# Patient Record
Sex: FEMALE | HISPANIC OR LATINO | Employment: OTHER | URBAN - METROPOLITAN AREA
[De-identification: names, ages, dates, MRNs, and addresses within clinical notes are randomized per-mention and may not be internally consistent; named-entity substitution may affect disease eponyms.]

---

## 2021-04-28 ENCOUNTER — TELEPHONE (OUTPATIENT)
Dept: GASTROENTEROLOGY | Facility: CLINIC | Age: 66
End: 2021-04-28

## 2022-01-12 ENCOUNTER — OFFICE VISIT (OUTPATIENT)
Dept: INTERNAL MEDICINE | Facility: CLINIC | Age: 67
End: 2022-01-12

## 2022-01-12 VITALS
HEIGHT: 60 IN | RESPIRATION RATE: 16 BRPM | OXYGEN SATURATION: 97 % | SYSTOLIC BLOOD PRESSURE: 122 MMHG | HEART RATE: 68 BPM | WEIGHT: 96 LBS | TEMPERATURE: 97.7 F | DIASTOLIC BLOOD PRESSURE: 70 MMHG | BODY MASS INDEX: 18.85 KG/M2

## 2022-01-12 DIAGNOSIS — E78.2 MODERATE MIXED HYPERLIPIDEMIA NOT REQUIRING STATIN THERAPY: ICD-10-CM

## 2022-01-12 DIAGNOSIS — Z00.00 MEDICARE ANNUAL WELLNESS VISIT, SUBSEQUENT: Primary | ICD-10-CM

## 2022-01-12 DIAGNOSIS — M81.0 OSTEOPOROSIS WITHOUT CURRENT PATHOLOGICAL FRACTURE, UNSPECIFIED OSTEOPOROSIS TYPE: ICD-10-CM

## 2022-01-12 DIAGNOSIS — F41.9 ANXIETY: ICD-10-CM

## 2022-01-12 DIAGNOSIS — G47.09 OTHER INSOMNIA: ICD-10-CM

## 2022-01-12 DIAGNOSIS — Z79.899 HIGH RISK MEDICATION USE: ICD-10-CM

## 2022-01-12 DIAGNOSIS — Z78.0 POSTMENOPAUSAL: ICD-10-CM

## 2022-01-12 PROBLEM — K52.9 COLITIS: Status: ACTIVE | Noted: 2022-01-12

## 2022-01-12 PROBLEM — G47.00 INSOMNIA: Status: ACTIVE | Noted: 2019-12-05

## 2022-01-12 PROBLEM — R19.7 DIARRHEA: Status: ACTIVE | Noted: 2021-08-25

## 2022-01-12 PROCEDURE — 99203 OFFICE O/P NEW LOW 30 MIN: CPT | Performed by: NURSE PRACTITIONER

## 2022-01-12 PROCEDURE — 1159F MED LIST DOCD IN RCRD: CPT | Performed by: NURSE PRACTITIONER

## 2022-01-12 PROCEDURE — 1126F AMNT PAIN NOTED NONE PRSNT: CPT | Performed by: NURSE PRACTITIONER

## 2022-01-12 PROCEDURE — 1170F FXNL STATUS ASSESSED: CPT | Performed by: NURSE PRACTITIONER

## 2022-01-12 PROCEDURE — G0439 PPPS, SUBSEQ VISIT: HCPCS | Performed by: NURSE PRACTITIONER

## 2022-01-12 RX ORDER — BUSPIRONE HYDROCHLORIDE 5 MG/1
TABLET ORAL
COMMUNITY
Start: 2020-12-30 | End: 2022-01-12 | Stop reason: SDUPTHER

## 2022-01-12 RX ORDER — BUDESONIDE 3 MG/1
9 CAPSULE, COATED PELLETS ORAL DAILY
COMMUNITY
Start: 2021-05-14 | End: 2022-10-27

## 2022-01-12 RX ORDER — ZOLPIDEM TARTRATE 5 MG/1
TABLET ORAL
COMMUNITY
Start: 2018-10-18 | End: 2022-08-31 | Stop reason: SDUPTHER

## 2022-01-12 RX ORDER — DIPHENOXYLATE HYDROCHLORIDE AND ATROPINE SULFATE 2.5; .025 MG/1; MG/1
1 TABLET ORAL DAILY
COMMUNITY

## 2022-01-12 RX ORDER — BUSPIRONE HYDROCHLORIDE 5 MG/1
5 TABLET ORAL 3 TIMES DAILY
Qty: 90 TABLET | Refills: 1 | Status: SHIPPED | OUTPATIENT
Start: 2022-01-12 | End: 2022-04-04

## 2022-01-12 NOTE — PROGRESS NOTES
The ABCs of the Annual Wellness Visit  Subsequent Medicare Wellness Visit    Chief Complaint   Patient presents with   • Medicare Wellness-subsequent   • Anxiety   • Establish Care      Subjective    History of Present Illness:  Terri albright is a 66 y.o. female who presents for a Subsequent Medicare Wellness Visit and to establish care. She is a previous patient of Dr Lucy suarez at [facility] and would like to transfer care to [facility]. She has anxiety and insomnia. She takes buspar as needed and ambien as needed. She states her anxiety has worsened and wants to discuss treatment. She has Collagenous colitis and is followed by Gastro at [facility]. I have reviewed her records in care everywhere       The following portions of the patient's history were reviewed and   updated as appropriate: allergies, current medications, past family history, past medical history, past social history, past surgical history and problem list.    Compared to one year ago, the patient feels her physical   health is the same.    Compared to one year ago, the patient feels her mental   health is the same.    Recent Hospitalizations:  She was not admitted to the hospital during the last year.       Current Medical Providers:  Patient Care Team:  Kitty Schmitt APRN as PCP - General (Family Medicine)    Outpatient Medications Prior to Visit   Medication Sig Dispense Refill   • Ascorbic Acid (VITAMIN C PO) Take  by mouth.     • Budesonide (ENTOCORT EC) 3 MG 24 hr capsule Take 9 mg by mouth Daily.     • Glucos-Chond-Hyal Ac-Ca Fructo (MOVE FREE JOINT HEALTH ADVANCE PO) Take  by mouth.     • Multiple Vitamins-Minerals (ALGAE BASED CALCIUM PO) Take  by mouth.     • multivitamin (multivitamin) tablet tablet Take 1 tablet by mouth Daily.     • Omega-3 Fatty Acids (FISH OIL PO) Take  by mouth.     • Probiotic Product (PROBIOTIC PO) Take  by mouth.     • TURMERIC PO Take  by mouth.     • zolpidem (AMBIEN) 5 MG tablet TAKE 0.5-1 TABLETS  "BY MOUTH NIGHTLY AS NEEDED FOR SLEEP. MUST LAST 30 DAYS. MAX DAILY AMOUNT: 5 MG     • busPIRone (BUSPAR) 5 MG tablet        No facility-administered medications prior to visit.       No opioid medication identified on active medication list. I have reviewed chart for other potential  high risk medication/s and harmful drug interactions in the elderly.          Aspirin is not on active medication list.  Aspirin use is not indicated based on review of current medical condition/s. Risk of harm outweighs potential benefits.  .    Patient Active Problem List   Diagnosis   • Colitis   • Anxiety   • Diarrhea   • Insomnia   • Osteoporosis     Advance Care Planning  Advance Directive is not on file.  ACP discussion was held with the patient during this visit. Patient has an advance directive (not in EMR), copy requested.    Review of Systems   Constitutional: Negative for fatigue.   HENT: Negative.    Respiratory: Negative.    Gastrointestinal: Negative for abdominal pain and diarrhea.        History of collagenous colitis    Endocrine: Negative.    Genitourinary: Negative.    Musculoskeletal: Negative.    Neurological: Negative.    Psychiatric/Behavioral: Positive for sleep disturbance. Negative for dysphoric mood. The patient is nervous/anxious.         Objective    Vitals:    01/12/22 0815   BP: 122/70   Pulse: 68   Resp: 16   Temp: 97.7 °F (36.5 °C)   TempSrc: Temporal   SpO2: 97%   Weight: 43.5 kg (96 lb)   Height: 152.4 cm (60\")   PainSc: 0-No pain     BMI Readings from Last 1 Encounters:   01/12/22 18.75 kg/m²   BMI is within normal parameters. No follow-up required.    Does the patient have evidence of cognitive impairment? No significant, occasionally forgetful     Physical Exam  Vitals and nursing note reviewed.   Constitutional:       General: She is not in acute distress.     Appearance: Normal appearance. She is well-developed and well-groomed.   HENT:      Head: Normocephalic.   Cardiovascular:      Rate and " Rhythm: Normal rate and regular rhythm.   Pulmonary:      Effort: Pulmonary effort is normal.      Breath sounds: Normal breath sounds.   Skin:     General: Skin is warm and dry.   Neurological:      Mental Status: She is alert and oriented to person, place, and time.   Psychiatric:         Mood and Affect: Mood normal.                 HEALTH RISK ASSESSMENT    Smoking Status:  Social History     Tobacco Use   Smoking Status Never Smoker   Smokeless Tobacco Not on file     Alcohol Consumption:  Social History     Substance and Sexual Activity   Alcohol Use Yes   • Alcohol/week: 4.0 standard drinks   • Types: 4 Glasses of wine per week    Comment: with dinner     Fall Risk Screen:    STEADI Fall Risk Assessment was completed, and patient is at LOW risk for falls.Assessment completed on:1/12/2022    Depression Screening:  PHQ-2/PHQ-9 Depression Screening 1/12/2022   Little interest or pleasure in doing things 0   Feeling down, depressed, or hopeless 0   Total Score 0       Health Habits and Functional and Cognitive Screening:  Functional & Cognitive Status 1/12/2022   Do you have difficulty preparing food and eating? No   Do you have difficulty bathing yourself, getting dressed or grooming yourself? No   Do you have difficulty using the toilet? No   Do you have difficulty moving around from place to place? No   Do you have trouble with steps or getting out of a bed or a chair? No   Current Diet Unhealthy Diet   Dental Exam Up to date   Eye Exam Up to date   Exercise (times per week) 7 times per week   Current Exercises Include Walking   Do you need help using the phone?  No   Are you deaf or do you have serious difficulty hearing?  No   Do you need help with transportation? No   Do you need help shopping? No   Do you need help preparing meals?  No   Do you need help with housework?  No   Do you need help with laundry? No   Do you need help taking your medications? No   Do you need help managing money? No   Do you ever  drive or ride in a car without wearing a seat belt? No   Have you felt unusual stress, anger or loneliness in the last month? No   Who do you live with? Spouse   If you need help, do you have trouble finding someone available to you? No   Have you been bothered in the last four weeks by sexual problems? No   Do you have difficulty concentrating, remembering or making decisions? No       Age-appropriate Screening Schedule:  Refer to the list below for future screening recommendations based on patient's age, sex and/or medical conditions. Orders for these recommended tests are listed in the plan section. The patient has been provided with a written plan.    Health Maintenance   Topic Date Due   • DXA SCAN  12/05/2020   • LIPID PANEL  01/12/2022   • TDAP/TD VACCINES (1 - Tdap) 01/12/2022 (Originally 8/6/1974)   • MAMMOGRAM  04/07/2023   • INFLUENZA VACCINE  Completed   • ZOSTER VACCINE  Completed              Assessment/Plan   CMS Preventative Services Quick Reference  Risk Factors Identified During Encounter  Immunizations Discussed/Encouraged (specific Immunizations; Tdap- recommend booster at pharmacy   The above risks/problems have been discussed with the patient.  Follow up actions/plans if indicated are seen below in the Assessment/Plan Section.  Pertinent information has been shared with the patient in the After Visit Summary.    Diagnoses and all orders for this visit:    1. Medicare annual wellness visit, subsequent (Primary)    2. Moderate mixed hyperlipidemia not requiring statin therapy  -     Lipid Panel With LDL / HDL Ratio  -     Comprehensive Metabolic Panel  -     Urinalysis With Microscopic - Urine, Clean Catch  -     TSH Rfx On Abnormal To Free T4  -     CBC & Differential    3. Other insomnia  -     Urine Drug Screen - Urine, Clean Catch    4. Anxiety    5. Osteoporosis without current pathological fracture, unspecified osteoporosis type  -     DEXA Bone Density Axial    6. Postmenopausal  -      DEXA Bone Density Axial    7. High risk medication use  -     Urine Drug Screen - Urine, Clean Catch    Other orders  -     busPIRone (BUSPAR) 5 MG tablet; Take 1 tablet by mouth 3 (Three) Times a Day.  Dispense: 90 tablet; Refill: 1      Will have her return for fasting labs  She does not currently need a refill on ambien.  She takes this as needed. Alex was reviewed and is appropriate. Will obtain UDS when she comes in for fasting labs. The patient has read and signed the [facility] Controlled Substance Contract.  I will continue to see patient for regular follow up appointments.  They are well controlled on their medication.  ALEX is updated every 3 months. The patient is aware of the potential for addiction and dependence.  For anxiety recommend that she take buspar regularly instead of prn. She can start with 5mg daily and increase to three times daily as tolerated   Follow Up:   Return in about 1 week (around 1/19/2022) for fasting labs only (orders in) .     An After Visit Summary and PPPS were made available to the patient.

## 2022-02-26 LAB
ALBUMIN SERPL-MCNC: 4.2 G/DL (ref 3.8–4.8)
ALBUMIN/GLOB SERPL: 1.3 {RATIO} (ref 1.2–2.2)
ALP SERPL-CCNC: 46 IU/L (ref 44–121)
ALT SERPL-CCNC: 16 IU/L (ref 0–32)
AMPHETAMINES UR QL SCN: NEGATIVE NG/ML
APPEARANCE UR: ABNORMAL
AST SERPL-CCNC: 15 IU/L (ref 0–40)
BACTERIA #/AREA URNS HPF: NORMAL /[HPF]
BARBITURATES UR QL SCN: NEGATIVE NG/ML
BASOPHILS # BLD AUTO: 0 X10E3/UL (ref 0–0.2)
BASOPHILS NFR BLD AUTO: 1 %
BENZODIAZ UR QL SCN: NEGATIVE NG/ML
BILIRUB SERPL-MCNC: 0.3 MG/DL (ref 0–1.2)
BILIRUB UR QL STRIP: NEGATIVE
BUN SERPL-MCNC: 15 MG/DL (ref 8–27)
BUN/CREAT SERPL: 14 (ref 12–28)
BZE UR QL SCN: NEGATIVE NG/ML
CALCIUM SERPL-MCNC: 9.5 MG/DL (ref 8.7–10.3)
CANNABINOIDS UR QL SCN: NEGATIVE NG/ML
CASTS URNS QL MICRO: NORMAL /LPF
CHLORIDE SERPL-SCNC: 107 MMOL/L (ref 96–106)
CHOLEST SERPL-MCNC: 220 MG/DL (ref 100–199)
CO2 SERPL-SCNC: 23 MMOL/L (ref 20–29)
COLOR UR: YELLOW
CREAT SERPL-MCNC: 1.09 MG/DL (ref 0.57–1)
CREAT UR-MCNC: 117.8 MG/DL (ref 20–300)
EOSINOPHIL # BLD AUTO: 0.1 X10E3/UL (ref 0–0.4)
EOSINOPHIL NFR BLD AUTO: 2 %
EPI CELLS #/AREA URNS HPF: NORMAL /HPF (ref 0–10)
ERYTHROCYTE [DISTWIDTH] IN BLOOD BY AUTOMATED COUNT: 13.7 % (ref 11.7–15.4)
GLOBULIN SER CALC-MCNC: 3.2 G/DL (ref 1.5–4.5)
GLUCOSE SERPL-MCNC: 85 MG/DL (ref 65–99)
GLUCOSE UR QL STRIP: NEGATIVE
HCT VFR BLD AUTO: 35.1 % (ref 34–46.6)
HDLC SERPL-MCNC: 90 MG/DL
HGB BLD-MCNC: 11.7 G/DL (ref 11.1–15.9)
HGB UR QL STRIP: NEGATIVE
IMM GRANULOCYTES # BLD AUTO: 0 X10E3/UL (ref 0–0.1)
IMM GRANULOCYTES NFR BLD AUTO: 0 %
KETONES UR QL STRIP: NEGATIVE
LABORATORY COMMENT REPORT: NORMAL
LDLC SERPL CALC-MCNC: 111 MG/DL (ref 0–99)
LDLC/HDLC SERPL: 1.2 RATIO (ref 0–3.2)
LEUKOCYTE ESTERASE UR QL STRIP: NEGATIVE
LYMPHOCYTES # BLD AUTO: 1.9 X10E3/UL (ref 0.7–3.1)
LYMPHOCYTES NFR BLD AUTO: 33 %
MCH RBC QN AUTO: 29.9 PG (ref 26.6–33)
MCHC RBC AUTO-ENTMCNC: 33.3 G/DL (ref 31.5–35.7)
MCV RBC AUTO: 90 FL (ref 79–97)
METHADONE UR QL SCN: NEGATIVE NG/ML
MICRO URNS: ABNORMAL
MICRO URNS: ABNORMAL
MONOCYTES # BLD AUTO: 0.4 X10E3/UL (ref 0.1–0.9)
MONOCYTES NFR BLD AUTO: 6 %
NEUTROPHILS # BLD AUTO: 3.3 X10E3/UL (ref 1.4–7)
NEUTROPHILS NFR BLD AUTO: 58 %
NITRITE UR QL STRIP: NEGATIVE
OPIATES UR QL SCN: NEGATIVE NG/ML
OXYCODONE+OXYMORPHONE UR QL SCN: NEGATIVE NG/ML
PCP UR QL: NEGATIVE NG/ML
PH UR STRIP: 7 [PH] (ref 5–7.5)
PH UR: 7.5 [PH] (ref 4.5–8.9)
PLATELET # BLD AUTO: 262 X10E3/UL (ref 150–450)
POTASSIUM SERPL-SCNC: 3.7 MMOL/L (ref 3.5–5.2)
PROPOXYPH UR QL SCN: NEGATIVE NG/ML
PROT SERPL-MCNC: 7.4 G/DL (ref 6–8.5)
PROT UR QL STRIP: NEGATIVE
RBC # BLD AUTO: 3.91 X10E6/UL (ref 3.77–5.28)
RBC #/AREA URNS HPF: NORMAL /HPF (ref 0–2)
SODIUM SERPL-SCNC: 143 MMOL/L (ref 134–144)
SP GR UR STRIP: 1.02 (ref 1–1.03)
TRIGL SERPL-MCNC: 108 MG/DL (ref 0–149)
TSH SERPL DL<=0.005 MIU/L-ACNC: 2.29 UIU/ML (ref 0.45–4.5)
UROBILINOGEN UR STRIP-MCNC: 0.2 MG/DL (ref 0.2–1)
VLDLC SERPL CALC-MCNC: 19 MG/DL (ref 5–40)
WBC # BLD AUTO: 5.7 X10E3/UL (ref 3.4–10.8)
WBC #/AREA URNS HPF: NORMAL /HPF (ref 0–5)

## 2022-04-04 RX ORDER — BUSPIRONE HYDROCHLORIDE 5 MG/1
TABLET ORAL
Qty: 90 TABLET | Refills: 1 | Status: SHIPPED | OUTPATIENT
Start: 2022-04-04 | End: 2022-08-31

## 2022-05-13 ENCOUNTER — TELEMEDICINE (OUTPATIENT)
Dept: INTERNAL MEDICINE | Facility: CLINIC | Age: 67
End: 2022-05-13

## 2022-05-13 ENCOUNTER — TELEPHONE (OUTPATIENT)
Dept: INTERNAL MEDICINE | Facility: CLINIC | Age: 67
End: 2022-05-13

## 2022-05-13 DIAGNOSIS — U07.1 COVID-19: Primary | ICD-10-CM

## 2022-05-13 PROCEDURE — 99213 OFFICE O/P EST LOW 20 MIN: CPT | Performed by: NURSE PRACTITIONER

## 2022-05-13 RX ORDER — BUDESONIDE 3 MG/1
3 CAPSULE, COATED PELLETS ORAL DAILY
COMMUNITY
Start: 2022-04-07 | End: 2023-03-02

## 2022-05-13 NOTE — TELEPHONE ENCOUNTER
Caller: Terri Nicole    Relationship to patient: Self    Best call back number: [phone] (H)    Date of exposure:  05/11/22    Date of positive COVID19 test: 05/13/22    Date if possible COVID19 exposure: 05/11/22    COVID19 symptoms:  CHILLS, SWEATY, SORE THROAT AND CONGESTION.    Date of initial quarantine: 05/13/22    Additional information or concerns:  PATIENT CALLED TO ADVISE THAT SHE HAS TESTED POSITIVE FOR COVID THIS MORNING. PATIENT IS WANTING TO KNOW HOW TO MAKE THE SYMPTOMS BETTER.     What is the patients preferred pharmacy: [id number] [address] AT [facility] [phone] [facility] [phone] FX            THANKS

## 2022-05-13 NOTE — PROGRESS NOTES
Subjective   Terri albright is a 66 y.o. female.   Chief Complaint   Patient presents with   • URI     Tested positive for covid 5/13/22        There were no vitals filed for this visit.  No LMP recorded. Patient is postmenopausal.    History of Present Illness  Terri is a 66 year old female patient who is being seen via telemedicine for an acute visit. Both the patient and provider are located in [place], [place]. She c/o sore throat, headache, and congestion that started on 5/12/22. She took a home covid test that was positive. She denies fever, chest pain, and shortness of breath. She is full vaccinated with a recent booster     The following portions of the patient's history were reviewed and updated as appropriate: allergies, current medications, past family history, past medical history, past social history, past surgical history and problem list.    Review of Systems   Constitutional: Positive for fatigue. Negative for fever.   HENT: Positive for congestion, ear pain, sinus pressure, sinus pain and sore throat.    Respiratory: Negative for chest tightness and shortness of breath.    Cardiovascular: Negative.    Neurological: Positive for headaches.       Objective   Physical Exam  Constitutional:       General: She is not in acute distress.  Neurological:      Mental Status: She is alert.         Assessment & Plan   Diagnoses and all orders for this visit:    1. COVID-19 (Primary)  -     Molnupiravir (LAGEVRIO) 200 MG capsule; Take 4 capsules by mouth Every 12 (Twelve) Hours for 5 days.  Dispense: 40 capsule; Refill: 0      rx for molupiravir sent to pharmacy - pt is aware that is is authorized for Emergency use by the FDA for tx of covid 19  Discussed potential side effects  I recommend attention to rest and fluids. I recommend as needed tylenol for fevers and aches. I recommend adding OTC vitamin D, C and zinc. I recommend monitoring pulse oximetry if you have access to that. You should quarantine  for ten days.  It can be five days if you are feeling better and fever free but you should continue to wear a mask for a total of ten days around others. Lagevrio is FDA approved for emergency use.  It is less effective than Paxlovid but has fewer drug interactions.  It should not be used in men and women of child bearing age.    Reasons to go to the ER include severe trouble breathing, chest pain, confusion, inability to wake or stay awake, and bluish lips or face.  Continue supportive measures and let me know if there is any change in symptoms.

## 2022-05-13 NOTE — PATIENT INSTRUCTIONS
I recommend attention to rest and fluids. I recommend as needed tylenol for fevers and aches. I recommend adding OTC vitamin D, C and zinc. I recommend monitoring pulse oximetry if you have access to that. You should quarantine for ten days.  It can be five days if you are feeling better and fever free but you should continue to wear a mask for a total of ten days around others. Lagevrio is FDA approved for emergency use.  It is less effective than Paxlovid but has fewer drug interactions.  It should not be used in men and women of child bearing age.    Reasons to go to the ER include severe trouble breathing, chest pain, confusion, inability to wake or stay awake, and bluish lips or face.  Continue supportive measures and let me know if there is any change in symptoms.

## 2022-07-18 ENCOUNTER — HOSPITAL ENCOUNTER (OUTPATIENT)
Dept: BONE DENSITY | Facility: HOSPITAL | Age: 67
Discharge: HOME | End: 2022-07-18
Admitting: NURSE PRACTITIONER

## 2022-07-18 PROCEDURE — 77080 DXA BONE DENSITY AXIAL: CPT

## 2022-08-31 ENCOUNTER — OFFICE VISIT (OUTPATIENT)
Dept: INTERNAL MEDICINE | Facility: CLINIC | Age: 67
End: 2022-08-31

## 2022-08-31 VITALS
HEART RATE: 85 BPM | WEIGHT: 94 LBS | SYSTOLIC BLOOD PRESSURE: 110 MMHG | OXYGEN SATURATION: 98 % | HEIGHT: 60 IN | RESPIRATION RATE: 16 BRPM | BODY MASS INDEX: 18.46 KG/M2 | DIASTOLIC BLOOD PRESSURE: 56 MMHG

## 2022-08-31 DIAGNOSIS — R10.31 GROIN PAIN, CHRONIC, RIGHT: ICD-10-CM

## 2022-08-31 DIAGNOSIS — F51.01 PRIMARY INSOMNIA: Primary | ICD-10-CM

## 2022-08-31 DIAGNOSIS — M81.0 OSTEOPOROSIS WITHOUT CURRENT PATHOLOGICAL FRACTURE, UNSPECIFIED OSTEOPOROSIS TYPE: ICD-10-CM

## 2022-08-31 DIAGNOSIS — G89.29 GROIN PAIN, CHRONIC, RIGHT: ICD-10-CM

## 2022-08-31 DIAGNOSIS — F41.9 ANXIETY: ICD-10-CM

## 2022-08-31 PROCEDURE — 99214 OFFICE O/P EST MOD 30 MIN: CPT | Performed by: NURSE PRACTITIONER

## 2022-08-31 RX ORDER — ZOLPIDEM TARTRATE 5 MG/1
5 TABLET ORAL NIGHTLY PRN
Qty: 30 TABLET | Refills: 3 | Status: SHIPPED | OUTPATIENT
Start: 2022-08-31 | End: 2026-07-14

## 2022-08-31 RX ORDER — BUSPIRONE HYDROCHLORIDE 5 MG/1
5 TABLET ORAL 3 TIMES DAILY
Qty: 90 TABLET | Refills: 3 | Status: SHIPPED | OUTPATIENT
Start: 2022-08-31 | End: 2022-09-12

## 2022-08-31 NOTE — PROGRESS NOTES
Subjective   Terri albright is a 67 y.o. female. Patient is here today for   Chief Complaint   Patient presents with   • Insomnia   • Groin Pain     Only with certain movements           Vitals:    08/31/22 0945   BP: 110/56   Pulse: 85   Resp: 16   SpO2: 98%     Body mass index is 18.36 kg/m².  The following portions of the patient's history were reviewed and updated as appropriate: allergies, current medications, past family history, past medical history, past social history, past surgical history and problem list.    Past Medical History:   Diagnosis Date   • Anxiety    • Cataract    • Glaucoma    • Inflammatory bowel disease    • Osteopenia       No Known Allergies   Social History     Socioeconomic History   • Marital status:    Tobacco Use   • Smoking status: Never Smoker   Substance and Sexual Activity   • Alcohol use: Yes     Alcohol/week: 4.0 standard drinks     Types: 4 Glasses of wine per week     Comment: with dinner   • Drug use: Never   • Sexual activity: Yes     Partners: Male     Birth control/protection: Post-menopausal        Current Outpatient Medications:   •  Ascorbic Acid (VITAMIN C PO), Take  by mouth., Disp: , Rfl:   •  Budesonide (ENTOCORT EC) 3 MG 24 hr capsule, Take 9 mg by mouth Daily., Disp: , Rfl:   •  Budesonide (ENTOCORT EC) 3 MG 24 hr capsule, Take 3 mg by mouth Daily., Disp: , Rfl:   •  busPIRone (BUSPAR) 5 MG tablet, Take 1 tablet by mouth 3 (Three) Times a Day., Disp: 90 tablet, Rfl: 3  •  Glucos-Chond-Hyal Ac-Ca Fructo (MOVE FREE JOINT HEALTH ADVANCE PO), Take  by mouth., Disp: , Rfl:   •  Multiple Vitamins-Minerals (ALGAE BASED CALCIUM PO), Take  by mouth., Disp: , Rfl:   •  multivitamin (THERAGRAN) tablet tablet, Take 1 tablet by mouth Daily., Disp: , Rfl:   •  Omega-3 Fatty Acids (FISH OIL PO), Take  by mouth., Disp: , Rfl:   •  Probiotic Product (PROBIOTIC PO), Take  by mouth., Disp: , Rfl:   •  TURMERIC PO, Take  by mouth., Disp: , Rfl:   •  zolpidem  (AMBIEN) 5 MG tablet, Take 1 tablet by mouth At Night As Needed for Sleep., Disp: 30 tablet, Rfl: 3     Objective     History of Present Illness  Terri is a 67 year old female patient who is here for medication refills. She has insomnia and takes ambien as needed. This was prescribed by her previous PCP. It works well for her and she is not experiencing any side effects. She also takes buspar for anxiety which works well for. Her she would like a refill today   She has had right groin that is only with certain movements. It started several months ago. She denies any known injury. She denies dysuria, urgency and frequency. She has intermittent diarrhea and constipation. She sees GI for colitis and is on a maintenance dose of budesonide  She has osteoporosis and was on fosamax many years ago. She states she did not tolerate it well. Last DEXA showed osteoporosis with some improvement in bone density of the right femoral neck      Review of Systems   Constitutional: Negative for fatigue and fever.   Respiratory: Negative.    Cardiovascular: Negative.    Gastrointestinal: Positive for constipation and diarrhea.   Genitourinary: Negative for dysuria, flank pain and frequency.   Musculoskeletal:        Right groin pain, no injury , only occurs with certain movements        Physical Exam  Vitals and nursing note reviewed.   Constitutional:       General: She is not in acute distress.     Appearance: Normal appearance. She is well-developed and well-groomed.   HENT:      Head: Normocephalic.   Cardiovascular:      Rate and Rhythm: Normal rate.   Pulmonary:      Effort: Pulmonary effort is normal.   Abdominal:      Hernia: There is no hernia in the left inguinal area or right inguinal area.       Neurological:      Mental Status: She is alert.         ASSESSMENT     Problems Addressed this Visit     Anxiety    Insomnia - Primary    Relevant Medications    zolpidem (AMBIEN) 5 MG tablet    Osteoporosis      Other Visit  Diagnoses     Groin pain, chronic, right        Relevant Orders    Ambulatory Referral to Physical Therapy Evaluate and treat      Diagnoses       Codes Comments    Primary insomnia    -  Primary ICD-10-CM: F51.01  ICD-9-CM: 307.42     Osteoporosis without current pathological fracture, unspecified osteoporosis type     ICD-10-CM: M81.0  ICD-9-CM: 733.00     Groin pain, chronic, right     ICD-10-CM: R10.31, G89.29  ICD-9-CM: 789.03, 338.29     Anxiety     ICD-10-CM: F41.9  ICD-9-CM: 300.00           PLAN  1. Insomnia- rx for ambien refmanuel Marquez reviewed and contract UTD  2. Anxiety- continue buspirone   3. Osteoporosis, discussed reclast and prolia, declines tx at this time, will continue exercise and calcium supplement. She will let me know if she changes her mind, will repeat DEXA in 2 years   4. Right groin pain with certain movements- will refer to PT, if no improvement will get imaging     Return in about 6 months (around 2/28/2023) for medicare wellness. or sooner if needed

## 2022-09-12 RX ORDER — BUSPIRONE HYDROCHLORIDE 5 MG/1
TABLET ORAL
Qty: 90 TABLET | Refills: 3 | Status: SHIPPED | OUTPATIENT
Start: 2022-09-12 | End: 2022-12-14

## 2022-10-27 ENCOUNTER — OFFICE VISIT (OUTPATIENT)
Dept: INTERNAL MEDICINE | Facility: CLINIC | Age: 67
End: 2022-10-27

## 2022-10-27 VITALS
RESPIRATION RATE: 16 BRPM | OXYGEN SATURATION: 98 % | HEIGHT: 60 IN | SYSTOLIC BLOOD PRESSURE: 130 MMHG | HEART RATE: 88 BPM | DIASTOLIC BLOOD PRESSURE: 70 MMHG | BODY MASS INDEX: 18.85 KG/M2 | WEIGHT: 96 LBS

## 2022-10-27 DIAGNOSIS — M53.3 COCCYX PAIN: Primary | ICD-10-CM

## 2022-10-27 DIAGNOSIS — M25.561 ACUTE PAIN OF RIGHT KNEE: ICD-10-CM

## 2022-10-27 PROCEDURE — 99213 OFFICE O/P EST LOW 20 MIN: CPT | Performed by: NURSE PRACTITIONER

## 2022-10-27 PROCEDURE — 72220 X-RAY EXAM SACRUM TAILBONE: CPT | Performed by: NURSE PRACTITIONER

## 2022-10-27 NOTE — PROGRESS NOTES
Subjective   Terri albright is a 67 y.o. female. Patient is here today for   Chief Complaint   Patient presents with   • Tailbone Pain   • Knee Pain     When walking   Answers for HPI/ROS submitted by the patient on 10/26/2022  Please describe your symptoms.: My knee pain is getting worse.  Have you had these symptoms before?: Yes  How long have you been having these symptoms?: Greater than 2 weeks  Please list any medications you are currently taking for this condition.: none  Please describe any probable cause for these symptoms. : Arthritis., Also, I hit my tailbone, it hurts.  What is the primary reason for your visit?: Other           Vitals:    10/27/22 1314   BP: 130/70   Pulse: 88   Resp: 16   SpO2: 98%     Body mass index is 18.75 kg/m².  The following portions of the patient's history were reviewed and updated as appropriate: allergies, current medications, past family history, past medical history, past social history, past surgical history and problem list.    Past Medical History:   Diagnosis Date   • Anxiety    • Cataract    • Glaucoma    • Inflammatory bowel disease    • Osteopenia       No Known Allergies   Social History     Socioeconomic History   • Marital status:    Tobacco Use   • Smoking status: Never   Substance and Sexual Activity   • Alcohol use: Yes     Alcohol/week: 4.0 standard drinks     Types: 4 Glasses of wine per week     Comment: with dinner   • Drug use: Never   • Sexual activity: Yes     Partners: Male     Birth control/protection: Post-menopausal        Current Outpatient Medications:   •  Ascorbic Acid (VITAMIN C PO), Take  by mouth., Disp: , Rfl:   •  Budesonide (ENTOCORT EC) 3 MG 24 hr capsule, Take 3 mg by mouth Daily., Disp: , Rfl:   •  busPIRone (BUSPAR) 5 MG tablet, TAKE 1 TABLET BY MOUTH THREE TIMES DAILY, Disp: 90 tablet, Rfl: 3  •  Glucos-Chond-Hyal Ac-Ca Fructo (MOVE FREE JOINT HEALTH ADVANCE PO), Take  by mouth., Disp: , Rfl:   •  Multiple  Vitamins-Minerals (ALGAE BASED CALCIUM PO), Take  by mouth., Disp: , Rfl:   •  multivitamin (THERAGRAN) tablet tablet, Take 1 tablet by mouth Daily., Disp: , Rfl:   •  Omega-3 Fatty Acids (FISH OIL PO), Take  by mouth., Disp: , Rfl:   •  Probiotic Product (PROBIOTIC PO), Take  by mouth., Disp: , Rfl:   •  TURMERIC PO, Take  by mouth., Disp: , Rfl:   •  zolpidem (AMBIEN) 5 MG tablet, Take 1 tablet by mouth At Night As Needed for Sleep., Disp: 30 tablet, Rfl: 3     Objective     History of Present Illness  Terri is a 67 year old female patient who is here for an acute visit. She c/o right knee pain for a month . She denies any known injury. She is requesting a referral to ortho  She also c/o of tail bone pain for one month after falling back onto a hard bench.   Knee Pain   There was no injury mechanism. The quality of the pain is described as aching. The pain is mild. The pain has been intermittent since onset. Pertinent negatives include no inability to bear weight, muscle weakness or numbness. The symptoms are aggravated by palpation and movement. She has tried rest and ice for the symptoms. The treatment provided mild relief.        Review of Systems   Constitutional: Negative for fatigue.   Respiratory: Negative.    Cardiovascular: Negative.    Musculoskeletal:        Right knee pain   Coccyx pain    Neurological: Negative for numbness.       Physical Exam  Vitals and nursing note reviewed.   Constitutional:       General: She is not in acute distress.     Appearance: Normal appearance. She is well-developed and well-groomed.   Cardiovascular:      Rate and Rhythm: Normal rate.   Pulmonary:      Effort: Pulmonary effort is normal.   Musculoskeletal:        Legs:    Neurological:      Mental Status: She is alert.         ASSESSMENT     Problems Addressed this Visit    None  Visit Diagnoses     Coccyx pain    -  Primary    Relevant Orders    XR Sacrum & Coccyx (In Office)    Acute pain of right knee        Relevant  Orders    Ambulatory Referral to Orthopedic Surgery      Diagnoses       Codes Comments    Coccyx pain    -  Primary ICD-10-CM: M53.3  ICD-9-CM: 724.79     Acute pain of right knee     ICD-10-CM: M25.561  ICD-9-CM: 719.46           PLAN    Will get an xray of sacruma nd coccyx due to ongoing pain   Recommend tylenol or motrin as needed  Avoid sitting for long periods  Can sit on a pillow for support if needed  Pt requests referral to ortho for right knee pain - will hold off on getting xray in the office on knee as ortho will likely do one in office   Follow up as needed and for continual care

## 2022-11-02 ENCOUNTER — TELEPHONE (OUTPATIENT)
Dept: INTERNAL MEDICINE | Facility: CLINIC | Age: 67
End: 2022-11-02

## 2022-11-02 NOTE — TELEPHONE ENCOUNTER
----- Message from FRANK Hansen sent at 11/2/2022  3:08 PM EDT -----  Please call her and let her know that there is no abnormality or fracture of the sacrum or coccyx. She does have degenerative changes of the lumbar spine. Recommend that she follow up if pain persists

## 2022-11-08 ENCOUNTER — OFFICE VISIT (OUTPATIENT)
Dept: ORTHOPEDIC SURGERY | Facility: CLINIC | Age: 67
End: 2022-11-08

## 2022-11-08 VITALS — BODY MASS INDEX: 17.98 KG/M2 | HEIGHT: 60 IN | WEIGHT: 91.6 LBS | TEMPERATURE: 97.6 F

## 2022-11-08 DIAGNOSIS — G89.29 CHRONIC PAIN OF RIGHT KNEE: ICD-10-CM

## 2022-11-08 DIAGNOSIS — M17.11 PATELLOFEMORAL ARTHRITIS OF RIGHT KNEE: Primary | ICD-10-CM

## 2022-11-08 DIAGNOSIS — M25.561 CHRONIC PAIN OF RIGHT KNEE: ICD-10-CM

## 2022-11-08 PROCEDURE — 73562 X-RAY EXAM OF KNEE 3: CPT | Performed by: NURSE PRACTITIONER

## 2022-11-08 PROCEDURE — 99214 OFFICE O/P EST MOD 30 MIN: CPT | Performed by: NURSE PRACTITIONER

## 2022-11-08 NOTE — PROGRESS NOTES
[facility]  New Problem      Patient Name: Terri albright  : 1955  Primary Care Physician: Kitty Schmitt APRN        Chief Complaint: Right knee pain    HPI:   Terri albright is a 67 y.o. year old who presents today for evaluation of right knee pain.  Patient reports a history of chronic knee pain for a number of years it has been worsening recently.  She notices her symptoms seem to be worse when going downstairs, no nighttime pain.  She endorses anterior lateral knee pain with a little bit of radiation into the upper leg but no clear extension into the calf.  She endorses a little bit of groin pain at times with certain yoga poses.  Overall she is very active lady does stretches and enjoys walking.  Walking does not seem to bother her knees so much.  She takes minimal medications, some supplements for joint health.  She is here today with new x-rays of her right knee for further evaluation and treatment.      Past Medical/Surgical, Social and Family History:  I have reviewed and/or updated pertinent history as noted in the medical record including:  Past Medical History:   Diagnosis Date   • Anxiety    • Cataract    • Glaucoma    • Inflammatory bowel disease    • Knee swelling 10 years ago   • Osteopenia      Past Surgical History:   Procedure Laterality Date   •  SECTION     • COLONOSCOPY     • TONSILLECTOMY       Social History     Occupational History   • Not on file   Tobacco Use   • Smoking status: Never   • Smokeless tobacco: Not on file   Substance and Sexual Activity   • Alcohol use: Yes     Alcohol/week: 4.0 standard drinks     Types: 4 Glasses of wine per week     Comment: with dinner   • Drug use: Never   • Sexual activity: Yes     Partners: Male     Birth control/protection: Post-menopausal          Allergies: No Known Allergies    Medications:   Home Medications:  Current Outpatient Medications on File Prior to Visit   Medication Sig   • Ascorbic Acid  (VITAMIN C PO) Take  by mouth.   • Budesonide (ENTOCORT EC) 3 MG 24 hr capsule Take 3 mg by mouth Daily.   • busPIRone (BUSPAR) 5 MG tablet TAKE 1 TABLET BY MOUTH THREE TIMES DAILY   • Glucos-Chond-Hyal Ac-Ca Fructo (MOVE FREE JOINT HEALTH ADVANCE PO) Take  by mouth.   • Multiple Vitamins-Minerals (ALGAE BASED CALCIUM PO) Take  by mouth.   • multivitamin (THERAGRAN) tablet tablet Take 1 tablet by mouth Daily.   • Omega-3 Fatty Acids (FISH OIL PO) Take  by mouth.   • Probiotic Product (PROBIOTIC PO) Take  by mouth.   • TURMERIC PO Take  by mouth.   • zolpidem (AMBIEN) 5 MG tablet Take 1 tablet by mouth At Night As Needed for Sleep.     No current facility-administered medications on file prior to visit.         ROS:  14 point review of systems was negative except as listed in the HPI.    Physical Exam:   67 y.o. female  Body mass index is 17.89 kg/m²., 41.5 kg (91 lb 9.6 oz)  Vitals:    11/08/22 0848   Temp: 97.6 °F (36.4 °C)     General: Alert, cooperative, appears well and in no observable distress. Appears stated age and BMI as listed above.  HEENT: Normocephalic, atraumatic on external visual inspection.  CV: No significant peripheral edema.  Respiratory: Normal respiratory effort.  Skin: Warm & well perfused; appropriate skin turgor.  Psych: Appropriate mood & affect.  Neuro: Gross sensation and motor intact in affected extremity/extremities.  Vascular: Peripheral pulses palpable in affected extremity/extremities.     MSK Exam:  Right Knee  No deformity or wounds appreciated. No significant redness or warmth.  Trace effusion noted  Tenderness along the joint line appreciated :none  ROM 0-130 with pain at terminal motion.   Negative Bounce home  Negative Hans  Ligamentous exam grossly stable  Quad strength 4-4+/5    Left Knee  No deformity or wounds appreciated. No significant redness or warmth.  No significant effusion noted.  No significant tenderness appreciated about the joint.  ROM 0-130 and  painless.  Ligamentous exam grossly stable  Quad strength 4+ to 5/5    Brief hip exam in the affected extremity(ies) grossly unremarkable. No pain with passive internal or external rotation.  Moves ankle and toes up and down, no significant pain or swelling in the foot, ankle or calf.       Radiology:    The following X-rays were ordered/reviewed today to evaluate the patient's symptoms: Single Knee: AP standing and sunrise views of both knees, and lateral view of painful knee show Some degenerative changes primarily in the patellofemoral compartment.  Otherwise medial lateral joint spaces are well-preserved bilaterally.  She has some subtle evidence of meniscal chondrocalcinosis in the lateral aspect of the knee right greater than left. There are no prior films available for direct comparison.    Procedure:   N/A    Procedures    Misc. Data/Labs: N/A    Assessment & Plan:    ICD-10-CM ICD-9-CM   1. Patellofemoral arthritis of right knee  M17.11 716.96   2. Chronic pain of right knee  M25.561 719.46    G89.29 338.29     No orders of the defined types were placed in this encounter.    Orders Placed This Encounter   Procedures   • XR Knee 3 View Right         Is a 67-year-old female with right knee pain I suspect her symptoms are related to patellofemoral arthritis.  She does have some meniscal chondrocalcinosis noted on x-rays however I do not appreciate her symptoms seem to be related directly to that.  No locking or catching no mechanical symptoms otherwise.  We talked about conservative treatments she maintains a healthy weight, I we will give her some stretching and strengthening exercises, we could consider formal physical therapy.  We talked a little bit about injections as an option in the future.  We also talked about using Voltaren gel.  She does wear braces as needed intermittently I think that is also fine.  I will see her back in 4 weeks if needed, if her symptoms fail to improve we might consider the  injection or we could consider additional means of testing such as an MRI.    Return in about 4 weeks (around 12/6/2022), or if symptoms worsen or fail to improve.    Patient encouraged to call with questions or concerns prior to follow up.  Recommend ICE and/or HEAT PRN as discussed.  Will discuss with attending physician as needed.  Consider additional referrals, work up and/or advanced imaging as indicated or if patient fails to respond to conservative care.        Partha Sahu, APRN

## 2022-11-11 ENCOUNTER — PATIENT ROUNDING (BHMG ONLY) (OUTPATIENT)
Dept: ORTHOPEDIC SURGERY | Facility: CLINIC | Age: 67
End: 2022-11-11

## 2022-11-11 NOTE — PROGRESS NOTES
A Checkout10 Message has been sent to the patient for PATIENT ROUNDING with [facility]

## 2022-12-02 ENCOUNTER — TELEPHONE (OUTPATIENT)
Dept: INTERNAL MEDICINE | Facility: CLINIC | Age: 67
End: 2022-12-02

## 2022-12-02 DIAGNOSIS — M53.3 COCCYX PAIN: Primary | ICD-10-CM

## 2022-12-02 NOTE — TELEPHONE ENCOUNTER
----- Message from Nadiya Chong MA sent at 12/2/2022 12:24 PM EST -----  Regarding: FW: Physical therapy, referral  Contact: [phone]    ----- Message -----  From: Terri Nicole  Sent: 12/2/2022  12:23 PM EST  To: [facility]  Subject: Physical therapy, referral                       As you suggested, I asked the physical therapists if they could help with my tailbone. They said yes. Since it isn't getting better, I'd like to try it. Would you please send a referral to [facility] in [facility]?  Thanks,  Terri

## 2022-12-14 RX ORDER — BUSPIRONE HYDROCHLORIDE 5 MG/1
TABLET ORAL
Qty: 90 TABLET | Refills: 3 | Status: SHIPPED | OUTPATIENT
Start: 2022-12-14 | End: 2023-01-26 | Stop reason: SDUPTHER

## 2023-01-18 ENCOUNTER — TELEPHONE (OUTPATIENT)
Dept: INTERNAL MEDICINE | Facility: CLINIC | Age: 68
End: 2023-01-18
Payer: MEDICARE

## 2023-01-18 DIAGNOSIS — M53.3 COCCYX PAIN: Primary | ICD-10-CM

## 2023-01-18 NOTE — TELEPHONE ENCOUNTER
[facility] called and stated that patient is coming in tomorrow for coccyx pain. She said she needs an updated physical therapy order becuaee of medicare guidelines. The order in there is 30 days old and medicare needs updated order. Can you please put in physical therapy order.     [facility] fax number is below:  Fax # [phone]

## 2023-01-24 RX ORDER — BUSPIRONE HYDROCHLORIDE 5 MG/1
5 TABLET ORAL 3 TIMES DAILY
Qty: 90 TABLET | Refills: 2 | Status: CANCELLED | OUTPATIENT
Start: 2023-01-24

## 2023-01-26 RX ORDER — BUSPIRONE HYDROCHLORIDE 5 MG/1
5 TABLET ORAL 3 TIMES DAILY
Qty: 90 TABLET | Refills: 3 | Status: SHIPPED | OUTPATIENT
Start: 2023-01-26

## 2023-02-01 ENCOUNTER — TELEPHONE (OUTPATIENT)
Dept: INTERNAL MEDICINE | Facility: CLINIC | Age: 68
End: 2023-02-01
Payer: MEDICARE

## 2023-02-01 NOTE — TELEPHONE ENCOUNTER
Please call [facility] and I need diagnosis codes for all of these orders. She does not have it listed on her chart. Medicare will not cover vitamin d , thyroid, and magnesium , crp without a diagnosis

## 2023-02-01 NOTE — TELEPHONE ENCOUNTER
----- Message from May Liriano sent at 2/1/2023  9:20 AM EST -----  Regarding: FW: labs  Contact: [phone]  Lab appointment mid February  ----- Message -----  From: Terri Nicole  Sent: 2/1/2023   9:04 AM EST  To: [facility]  Subject: labs                                             I'm scheduled for labs later in the month. Would it be possible to add the following?    Vitamin D, Metabolic Panel, Magnesium, TSH with Free T4 and Free T3, B12, CBC, CRP HS    I have a family history of thyroid challenges, a full thyroid test would be helpful    Thanks!    Terri

## 2023-02-09 ENCOUNTER — TELEPHONE (OUTPATIENT)
Dept: INTERNAL MEDICINE | Facility: CLINIC | Age: 68
End: 2023-02-09
Payer: MEDICARE

## 2023-02-09 DIAGNOSIS — F41.9 ANXIETY: ICD-10-CM

## 2023-02-09 DIAGNOSIS — F51.01 PRIMARY INSOMNIA: Primary | ICD-10-CM

## 2023-02-09 DIAGNOSIS — Z79.899 HIGH RISK MEDICATION USE: ICD-10-CM

## 2023-02-09 DIAGNOSIS — E78.2 MODERATE MIXED HYPERLIPIDEMIA NOT REQUIRING STATIN THERAPY: ICD-10-CM

## 2023-02-09 DIAGNOSIS — R53.82 CHRONIC FATIGUE: ICD-10-CM

## 2023-02-09 DIAGNOSIS — M81.0 OSTEOPOROSIS WITHOUT CURRENT PATHOLOGICAL FRACTURE, UNSPECIFIED OSTEOPOROSIS TYPE: ICD-10-CM

## 2023-02-09 NOTE — TELEPHONE ENCOUNTER
----- Message from Dennies Garrick, MA sent at 2/9/2023  2:35 PM EST -----  Regarding: FW: labs  Contact: [phone]    ----- Message -----  From: Terri Nicole  Sent: 2/9/2023   2:12 PM EST  To: [facility]  Subject: labs                                             I'm scheduled for labs later in the month. Would it be possible to add the following?   Vitamin D, Metabolic Panel, Magnesium, TSH with Free T4 and Free T3, B12, CBC, CRP HS    I have a family history of thyroid challenges, a full thyroid test would be helpful.  Vitamin D: Fatigue (R53.83), Osteoporosis M81.0, Anxiety F41.9, Diarrhea R19.7  Metabolic Panel:  Fatigue R53.83, Osteoporosis M81.0,Diarrhea R19.7, Abdominal Pain R10.9  Magnesium:  Fatigue R53.83, Osteoporosis M81.0,Diarrhea R19.7, Abdominal Pain R10.9  TSH, Free T3, Free T4:  Fatigue R53.83, Osteoporosis M81.0,Diarrhea R19.7, Abdominal Pain R10.9  B12:  Fatigue R53.83, Osteoporosis M81.0,Diarrhea R19.7, Abdominal Pain R10.9, Anxiety f41.9  CBC:  Fatigue R53.83, Osteoporosis M81.0,Diarrhea R19.7, Abdominal Pain R10.9  CRP HS:  Fatigue R53.83, Osteoporosis M81.0,Diarrhea R19.7, Abdominal Pain R10.9

## 2023-02-24 LAB
25(OH)D3+25(OH)D2 SERPL-MCNC: 120 NG/ML (ref 30–100)
ALBUMIN SERPL-MCNC: 4.4 G/DL (ref 3.5–5.2)
ALBUMIN/GLOB SERPL: 1.2 G/DL
ALP SERPL-CCNC: 60 U/L (ref 39–117)
ALT SERPL-CCNC: 22 U/L (ref 1–33)
AMPHETAMINES UR QL SCN: NEGATIVE NG/ML
APPEARANCE UR: ABNORMAL
AST SERPL-CCNC: 14 U/L (ref 1–32)
BACTERIA #/AREA URNS HPF: NORMAL /HPF
BARBITURATES UR QL SCN: NEGATIVE NG/ML
BASOPHILS # BLD AUTO: 0.06 10*3/MM3 (ref 0–0.2)
BASOPHILS NFR BLD AUTO: 1.1 % (ref 0–1.5)
BENZODIAZ UR QL SCN: NEGATIVE NG/ML
BILIRUB SERPL-MCNC: 0.4 MG/DL (ref 0–1.2)
BILIRUB UR QL STRIP: NEGATIVE
BUN SERPL-MCNC: 9 MG/DL (ref 8–23)
BUN/CREAT SERPL: 8 (ref 7–25)
BZE UR QL SCN: NEGATIVE NG/ML
CALCIUM SERPL-MCNC: 9.4 MG/DL (ref 8.6–10.5)
CANNABINOIDS UR QL SCN: NEGATIVE NG/ML
CHLORIDE SERPL-SCNC: 105 MMOL/L (ref 98–107)
CHOLEST SERPL-MCNC: 205 MG/DL (ref 0–200)
CO2 SERPL-SCNC: 25.3 MMOL/L (ref 22–29)
COLOR UR: YELLOW
CREAT SERPL-MCNC: 1.13 MG/DL (ref 0.57–1)
CREAT UR-MCNC: 69.9 MG/DL (ref 20–300)
CRP SERPL-MCNC: <0.3 MG/DL (ref 0–0.5)
EGFRCR SERPLBLD CKD-EPI 2021: 52.8 ML/MIN/1.73
EOSINOPHIL # BLD AUTO: 0.36 10*3/MM3 (ref 0–0.4)
EOSINOPHIL NFR BLD AUTO: 6.5 % (ref 0.3–6.2)
EPI CELLS #/AREA URNS HPF: NORMAL /HPF
ERYTHROCYTE [DISTWIDTH] IN BLOOD BY AUTOMATED COUNT: 13.3 % (ref 12.3–15.4)
GLOBULIN SER CALC-MCNC: 3.7 GM/DL
GLUCOSE SERPL-MCNC: 78 MG/DL (ref 65–99)
GLUCOSE UR QL STRIP: NEGATIVE
HCT VFR BLD AUTO: 34.6 % (ref 34–46.6)
HDLC SERPL-MCNC: 82 MG/DL (ref 40–60)
HGB BLD-MCNC: 11.5 G/DL (ref 12–15.9)
HGB UR QL STRIP: NEGATIVE
IMM GRANULOCYTES # BLD AUTO: 0.01 10*3/MM3 (ref 0–0.05)
IMM GRANULOCYTES NFR BLD AUTO: 0.2 % (ref 0–0.5)
KETONES UR QL STRIP: NEGATIVE
LABORATORY COMMENT REPORT: NORMAL
LDLC SERPL CALC-MCNC: 107 MG/DL (ref 0–100)
LDLC/HDLC SERPL: 1.27 {RATIO}
LEUKOCYTE ESTERASE UR QL STRIP: NEGATIVE
LYMPHOCYTES # BLD AUTO: 2.31 10*3/MM3 (ref 0.7–3.1)
LYMPHOCYTES NFR BLD AUTO: 41.5 % (ref 19.6–45.3)
MAGNESIUM SERPL-MCNC: 2 MG/DL (ref 1.6–2.4)
MCH RBC QN AUTO: 30 PG (ref 26.6–33)
MCHC RBC AUTO-ENTMCNC: 33.2 G/DL (ref 31.5–35.7)
MCV RBC AUTO: 90.3 FL (ref 79–97)
METHADONE UR QL SCN: NEGATIVE NG/ML
MONOCYTES # BLD AUTO: 0.35 10*3/MM3 (ref 0.1–0.9)
MONOCYTES NFR BLD AUTO: 6.3 % (ref 5–12)
NEUTROPHILS # BLD AUTO: 2.47 10*3/MM3 (ref 1.7–7)
NEUTROPHILS NFR BLD AUTO: 44.4 % (ref 42.7–76)
NITRITE UR QL STRIP: NEGATIVE
NRBC BLD AUTO-RTO: 0 /100 WBC (ref 0–0.2)
OPIATES UR QL SCN: NEGATIVE NG/ML
OXYCODONE+OXYMORPHONE UR QL SCN: NEGATIVE NG/ML
PCP UR QL: NEGATIVE NG/ML
PH UR STRIP: 7.5 [PH] (ref 5–8)
PH UR: 8.7 [PH] (ref 4.5–8.9)
PLATELET # BLD AUTO: 225 10*3/MM3 (ref 140–450)
POTASSIUM SERPL-SCNC: 3.8 MMOL/L (ref 3.5–5.2)
PROPOXYPH UR QL SCN: NEGATIVE NG/ML
PROT SERPL-MCNC: 8.1 G/DL (ref 6–8.5)
PROT UR QL STRIP: ABNORMAL
RBC # BLD AUTO: 3.83 10*6/MM3 (ref 3.77–5.28)
RBC #/AREA URNS HPF: NORMAL /HPF
SODIUM SERPL-SCNC: 139 MMOL/L (ref 136–145)
SP GR UR STRIP: 1.02 (ref 1–1.03)
T3FREE SERPL-MCNC: 2.8 PG/ML (ref 2–4.4)
T4 FREE SERPL-MCNC: 1.04 NG/DL (ref 0.93–1.7)
TRIGL SERPL-MCNC: 94 MG/DL (ref 0–150)
TSH SERPL DL<=0.005 MIU/L-ACNC: 2.23 UIU/ML (ref 0.27–4.2)
UROBILINOGEN UR STRIP-MCNC: ABNORMAL MG/DL
VIT B12 SERPL-MCNC: 591 PG/ML (ref 211–946)
VLDLC SERPL CALC-MCNC: 16 MG/DL (ref 5–40)
WBC # BLD AUTO: 5.56 10*3/MM3 (ref 3.4–10.8)
WBC #/AREA URNS HPF: NORMAL /HPF

## 2023-03-02 ENCOUNTER — OFFICE VISIT (OUTPATIENT)
Dept: INTERNAL MEDICINE | Facility: CLINIC | Age: 68
End: 2023-03-02
Payer: MEDICARE

## 2023-03-02 VITALS
RESPIRATION RATE: 16 BRPM | HEIGHT: 60 IN | SYSTOLIC BLOOD PRESSURE: 118 MMHG | TEMPERATURE: 97.2 F | DIASTOLIC BLOOD PRESSURE: 60 MMHG | BODY MASS INDEX: 18.53 KG/M2 | WEIGHT: 94.4 LBS

## 2023-03-02 DIAGNOSIS — F51.01 PRIMARY INSOMNIA: ICD-10-CM

## 2023-03-02 DIAGNOSIS — T45.2X1S VITAMIN D TOXICITY, ACCIDENTAL OR UNINTENTIONAL, SEQUELA: ICD-10-CM

## 2023-03-02 DIAGNOSIS — M79.672 LEFT FOOT PAIN: ICD-10-CM

## 2023-03-02 DIAGNOSIS — K52.9 COLITIS: ICD-10-CM

## 2023-03-02 DIAGNOSIS — E78.2 MODERATE MIXED HYPERLIPIDEMIA NOT REQUIRING STATIN THERAPY: ICD-10-CM

## 2023-03-02 DIAGNOSIS — Z00.00 MEDICARE ANNUAL WELLNESS VISIT, SUBSEQUENT: Primary | ICD-10-CM

## 2023-03-02 DIAGNOSIS — F41.9 ANXIETY: ICD-10-CM

## 2023-03-02 PROCEDURE — 1170F FXNL STATUS ASSESSED: CPT | Performed by: NURSE PRACTITIONER

## 2023-03-02 PROCEDURE — 99214 OFFICE O/P EST MOD 30 MIN: CPT | Performed by: NURSE PRACTITIONER

## 2023-03-02 PROCEDURE — G0439 PPPS, SUBSEQ VISIT: HCPCS | Performed by: NURSE PRACTITIONER

## 2023-03-02 PROCEDURE — 1126F AMNT PAIN NOTED NONE PRSNT: CPT | Performed by: NURSE PRACTITIONER

## 2023-03-02 PROCEDURE — 1159F MED LIST DOCD IN RCRD: CPT | Performed by: NURSE PRACTITIONER

## 2023-03-02 NOTE — PROGRESS NOTES
The ABCs of the Annual Wellness Visit  Subsequent Medicare Wellness Visit    Subjective    Terri albright is a 67 y.o. female who presents for a Subsequent Medicare Wellness Visit.    The following portions of the patient's history were reviewed and   updated as appropriate: allergies, current medications, past family history, past medical history, past social history, past surgical history and problem list.    Compared to one year ago, the patient feels her physical   health is the same.    Compared to one year ago, the patient feels her mental   health is the same.    Recent Hospitalizations:  She was not admitted to the hospital during the last year.       Current Medical Providers:  Patient Care Team:  Kitty Schmitt APRN as PCP - General (Family Medicine)    Outpatient Medications Prior to Visit   Medication Sig Dispense Refill   • Ascorbic Acid (VITAMIN C PO) Take  by mouth.     • busPIRone (BUSPAR) 5 MG tablet Take 1 tablet by mouth 3 (Three) Times a Day. 90 tablet 3   • Glucos-Chond-Hyal Ac-Ca Fructo (MOVE FREE JOINT HEALTH ADVANCE PO) Take  by mouth.     • Multiple Vitamins-Minerals (ALGAE BASED CALCIUM PO) Take  by mouth.     • multivitamin (THERAGRAN) tablet tablet Take 1 tablet by mouth Daily.     • NALTREXONE HCL PO Take  by mouth.     • Omega-3 Fatty Acids (FISH OIL PO) Take  by mouth.     • Probiotic Product (PROBIOTIC PO) Take  by mouth.     • TURMERIC PO Take  by mouth.     • zolpidem (AMBIEN) 5 MG tablet Take 1 tablet by mouth At Night As Needed for Sleep. 30 tablet 3   • Budesonide (ENTOCORT EC) 3 MG 24 hr capsule Take 1 capsule by mouth Daily.       No facility-administered medications prior to visit.       No opioid medication identified on active medication list. I have reviewed chart for other potential  high risk medication/s and harmful drug interactions in the elderly.          Aspirin is not on active medication list.  Aspirin use is not indicated based on review of current  "medical condition/s. Risk of harm outweighs potential benefits.  .    Patient Active Problem List   Diagnosis   • Colitis   • Anxiety   • Diarrhea   • Insomnia   • Osteoporosis     Advance Care Planning  Advance Directive is not on file.  ACP discussion was held with the patient during this visit. Patient has an advance directive (not in EMR), copy requested. brought with her today and scanned into epic      Objective    Vitals:    03/02/23 0815   BP: 118/60   Resp: 16   Temp: 97.2 °F (36.2 °C)   TempSrc: Infrared   Weight: 42.8 kg (94 lb 6.4 oz)   Height: 152.4 cm (60\")   PainSc: 0-No pain     Estimated body mass index is 18.44 kg/m² as calculated from the following:    Height as of this encounter: 152.4 cm (60\").    Weight as of this encounter: 42.8 kg (94 lb 6.4 oz).    BMI is below normal parameters (malnutrition). Recommendations: none (medical contraindication)      Does the patient have evidence of cognitive impairment? No    Lab Results   Component Value Date    CHLPL 205 (H) 02/23/2023    TRIG 94 02/23/2023    HDL 82 (H) 02/23/2023     (H) 02/23/2023    VLDL 16 02/23/2023        HEALTH RISK ASSESSMENT    Smoking Status:  Social History     Tobacco Use   Smoking Status Never   Smokeless Tobacco Not on file     Alcohol Consumption:  Social History     Substance and Sexual Activity   Alcohol Use Yes   • Alcohol/week: 4.0 standard drinks   • Types: 4 Glasses of wine per week    Comment: with dinner     Fall Risk Screen:    PAULETTE Fall Risk Assessment was completed, and patient is at LOW risk for falls.Assessment completed on:3/2/2023    Depression Screening:  PHQ-2/PHQ-9 Depression Screening 3/2/2023   Little Interest or Pleasure in Doing Things 0-->not at all   Feeling Down, Depressed or Hopeless 0-->not at all   PHQ-9: Brief Depression Severity Measure Score 0       Health Habits and Functional and Cognitive Screening:  Functional & Cognitive Status 3/2/2023   Do you have difficulty preparing food and " eating? No   Do you have difficulty bathing yourself, getting dressed or grooming yourself? No   Do you have difficulty using the toilet? No   Do you have difficulty moving around from place to place? No   Do you have trouble with steps or getting out of a bed or a chair? No   Current Diet Well Balanced Diet   Dental Exam Up to date   Eye Exam Up to date   Exercise (times per week) 7 times per week   Current Exercises Include Walking   Do you need help using the phone?  No   Are you deaf or do you have serious difficulty hearing?  No   Do you need help with transportation? No   Do you need help shopping? No   Do you need help preparing meals?  No   Do you need help with housework?  No   Do you need help with laundry? No   Do you need help taking your medications? No   Do you need help managing money? No   Do you ever drive or ride in a car without wearing a seat belt? No   Have you felt unusual stress, anger or loneliness in the last month? No   Who do you live with? Spouse   If you need help, do you have trouble finding someone available to you? No   Have you been bothered in the last four weeks by sexual problems? No   Do you have difficulty concentrating, remembering or making decisions? No       Age-appropriate Screening Schedule:  Refer to the list below for future screening recommendations based on patient's age, sex and/or medical conditions. Orders for these recommended tests are listed in the plan section. The patient has been provided with a written plan.    Health Maintenance   Topic Date Due   • MAMMOGRAM  04/07/2023   • LIPID PANEL  02/23/2024   • ANNUAL WELLNESS VISIT  03/02/2024   • DXA SCAN  07/18/2024   • COLORECTAL CANCER SCREENING  09/01/2031   • TDAP/TD VACCINES (2 - Td or Tdap) 01/18/2032   • HEPATITIS C SCREENING  Completed   • COVID-19 Vaccine  Completed   • INFLUENZA VACCINE  Completed   • Pneumococcal Vaccine 65+  Completed   • ZOSTER VACCINE  Completed                CMS Preventative  Services Quick Reference  Risk Factors Identified During Encounter  None Identified  The above risks/problems have been discussed with the patient.  Pertinent information has been shared with the patient in the After Visit Summary.  An After Visit Summary and PPPS were made available to the patient.    Follow Up:   Next Medicare Wellness visit to be scheduled in 1 year.       Additional E&M Note during same encounter follows:  Patient has multiple medical problems which are significant and separately identifiable that require additional work above and beyond the Medicare Wellness Visit.      Chief Complaint  Medicare Wellness-subsequent    Subjective        HPI  Terri albright is also being seen today for a follow up for anxiety, insomnia and HLD. She takes ambien as needed for insomnia. It works well for her and she is not experiencing any side effects. She is currently taking buspirone but would like something as needed for panic attacks/anxiety  She has been seen a naturopath in [place] who is prescribing naltrexone for her colitis . She had seen GI at [facility] in the past and was last seen a year ago.   I reviewed labs with her in detail  Telephone on 02/09/2023   Component Date Value Ref Range Status   • WBC 02/23/2023 5.56  3.40 - 10.80 10*3/mm3 Final   • RBC 02/23/2023 3.83  3.77 - 5.28 10*6/mm3 Final   • Hemoglobin 02/23/2023 11.5 (L)  12.0 - 15.9 g/dL Final   • Hematocrit 02/23/2023 34.6  34.0 - 46.6 % Final   • MCV 02/23/2023 90.3  79.0 - 97.0 fL Final   • MCH 02/23/2023 30.0  26.6 - 33.0 pg Final   • MCHC 02/23/2023 33.2  31.5 - 35.7 g/dL Final   • RDW 02/23/2023 13.3  12.3 - 15.4 % Final   • Platelets 02/23/2023 225  140 - 450 10*3/mm3 Final   • Neutrophil Rel % 02/23/2023 44.4  42.7 - 76.0 % Final   • Lymphocyte Rel % 02/23/2023 41.5  19.6 - 45.3 % Final   • Monocyte Rel % 02/23/2023 6.3  5.0 - 12.0 % Final   • Eosinophil Rel % 02/23/2023 6.5 (H)  0.3 - 6.2 % Final   • Basophil Rel % 02/23/2023  1.1  0.0 - 1.5 % Final   • Neutrophils Absolute 02/23/2023 2.47  1.70 - 7.00 10*3/mm3 Final   • Lymphocytes Absolute 02/23/2023 2.31  0.70 - 3.10 10*3/mm3 Final   • Monocytes Absolute 02/23/2023 0.35  0.10 - 0.90 10*3/mm3 Final   • Eosinophils Absolute 02/23/2023 0.36  0.00 - 0.40 10*3/mm3 Final   • Basophils Absolute 02/23/2023 0.06  0.00 - 0.20 10*3/mm3 Final   • Immature Granulocyte Rel % 02/23/2023 0.2  0.0 - 0.5 % Final   • Immature Grans Absolute 02/23/2023 0.01  0.00 - 0.05 10*3/mm3 Final   • nRBC 02/23/2023 0.0  0.0 - 0.2 /100 WBC Final   • Glucose 02/23/2023 78  65 - 99 mg/dL Final   • BUN 02/23/2023 9  8 - 23 mg/dL Final   • Creatinine 02/23/2023 1.13 (H)  0.57 - 1.00 mg/dL Final   • EGFR Result 02/23/2023 52.8 (L)  >60.0 mL/min/1.73 Final    Comment: GFR Normal >60  Chronic Kidney Disease <60  Kidney Failure <15     • BUN/Creatinine Ratio 02/23/2023 8.0  7.0 - 25.0 Final   • Sodium 02/23/2023 139  136 - 145 mmol/L Final   • Potassium 02/23/2023 3.8  3.5 - 5.2 mmol/L Final   • Chloride 02/23/2023 105  98 - 107 mmol/L Final   • Total CO2 02/23/2023 25.3  22.0 - 29.0 mmol/L Final   • Calcium 02/23/2023 9.4  8.6 - 10.5 mg/dL Final   • Total Protein 02/23/2023 8.1  6.0 - 8.5 g/dL Final   • Albumin 02/23/2023 4.4  3.5 - 5.2 g/dL Final   • Globulin 02/23/2023 3.7  gm/dL Final   • A/G Ratio 02/23/2023 1.2  g/dL Final   • Total Bilirubin 02/23/2023 0.4  0.0 - 1.2 mg/dL Final   • Alkaline Phosphatase 02/23/2023 60  39 - 117 U/L Final   • AST (SGOT) 02/23/2023 14  1 - 32 U/L Final   • ALT (SGPT) 02/23/2023 22  1 - 33 U/L Final   • Free T4 02/23/2023 1.04  0.93 - 1.70 ng/dL Final    Results may be falsely increased if patient taking Biotin.   • TSH 02/23/2023 2.230  0.270 - 4.200 uIU/mL Final   • T3, Free 02/23/2023 2.8  2.0 - 4.4 pg/mL Final   • 25 Hydroxy, Vitamin D 02/23/2023 120.0 (H)  30.0 - 100.0 ng/ml Final    Comment: Reference Range for Total Vitamin D 25(OH)  Deficiency <20.0 ng/mL  Insufficiency 21-29  ng/mL  Sufficiency  ng/mL  Toxicity >100 ng/ml     • Vitamin B-12 02/23/2023 591  211 - 946 pg/mL Final    Results may be falsely increased if patient taking Biotin.   • Total Cholesterol 02/23/2023 205 (H)  0 - 200 mg/dL Final    Comment: Cholesterol Reference Ranges  (U.S. Department of Health and Human Services ATP III  Classifications)  Desirable          <200 mg/dL  Borderline High    200-239 mg/dL  High Risk          >240 mg/dL  Triglyceride Reference Ranges  (U.S. Department of Health and Human Services ATP III  Classifications)  Normal           <150 mg/dL  Borderline High  150-199 mg/dL  High             200-499 mg/dL  Very High        >500 mg/dL  HDL Reference Ranges  (U.S. Department of Health and Human Services ATP III  Classifications)  Low     <40 mg/dl (major risk factor for CHD)  High    >60 mg/dl ('negative' risk factor for CHD)  LDL Reference Ranges  (U.S. Department of Health and Human Services ATP III  Classifications)  Optimal          <100 mg/dL  Near Optimal     100-129 mg/dL  Borderline High  130-159 mg/dL  High             160-189 mg/dL  Very High        >189 mg/dL     • Triglycerides 02/23/2023 94  0 - 150 mg/dL Final   • HDL Cholesterol 02/23/2023 82 (H)  40 - 60 mg/dL Final   • VLDL Cholesterol Mir 02/23/2023 16  5 - 40 mg/dL Final   • LDL Chol Calc ([facility]) 02/23/2023 107 (H)  0 - 100 mg/dL Final   • LDL/HDL RATIO 02/23/2023 1.27   Final   • Magnesium 02/23/2023 2.0  1.6 - 2.4 mg/dL Final   • Amphetamine, Urine Qual 02/23/2023 Negative  Bfveyc=8632 ng/mL Final   • Barbiturates Screen, Urine 02/23/2023 Negative  Wrnzwe=163 ng/mL Final   • Benzodiazepine Screen, Urine 02/23/2023 Negative  Nscvlg=379 ng/mL Final   • THC Screen, Urine 02/23/2023 Negative  Cutoff=20 ng/mL Final   • Cocaine Screen, Urine 02/23/2023 Negative  Plyvrf=157 ng/mL Final   • Opiate Screen, Urine 02/23/2023 Negative  Tisvxu=325 ng/mL Final    Opiate test includes Codeine, Morphine, Hydromorphone, Hydrocodone.   •  Oxycodone/Oxymorphone, Urine 02/23/2023 Negative  Ywslil=203 ng/mL Final    Test includes Oxycodone and Oxymorphone   • Phencyclidine (PCP), Urine 02/23/2023 Negative  Cutoff=25 ng/mL Final   • Methadone Screen, Urine 02/23/2023 Negative  Dhvkhz=417 ng/mL Final   • Propoxyphene Screen 02/23/2023 Negative  Zblsoa=201 ng/mL Final   • Creatinine, Urine 02/23/2023 69.9  20.0 - 300.0 mg/dL Final   • pH, UA 02/23/2023 8.7  4.5 - 8.9 Final   • Please note 02/23/2023 Comment   Final    Comment: This assay provides a preliminary unconfirmed analytical test  result that may be suitable for clinical management of patients  in certain situations. Drug-test results should be interpreted  in the context of clinical information. Patient metabolic  variables, specific drug chemistry, and specimen  characteristics can affect test outcome. Technical consultation  is available if a test result is inconsistent with an expected  outcome. ([email or web address] call toll-free  [phone])     • Specific Gattman, UA 02/23/2023 1.020  1.005 - 1.030 Final   • pH, UA 02/23/2023 7.5  5.0 - 8.0 Final   • Color, UA 02/23/2023 Yellow   Final    REFERENCE RANGE: Yellow, Straw   • Appearance, UA 02/23/2023 Cloudy (A)  Clear Final   • Leukocytes, UA 02/23/2023 Negative  Negative Final   • Protein 02/23/2023 See below: (A)  Negative Final    100 mg/dL (2+)   • Glucose, UA 02/23/2023 Negative  Negative Final   • Ketones 02/23/2023 Negative  Negative Final   • Blood, UA 02/23/2023 Negative  Negative Final   • Bilirubin, UA 02/23/2023 Negative  Negative Final   • Urobilinogen, UA 02/23/2023 Comment   Final    Comment: 0.2 E.U./dL  REFERENCE RANGE: 0.2 - 1.0 E.U./dL     • Nitrite, UA 02/23/2023 Negative  Negative Final   • C-Reactive Protein 02/23/2023 <0.30  0.00 - 0.50 mg/dL Final   • WBC, UA 02/23/2023 Comment  /HPF Final    Comment: None Seen  REFERENCE RANGE: None Seen, 0-2     • RBC, UA 02/23/2023 Comment  /HPF Final    Comment:  "None Seen  REFERENCE RANGE: None Seen, 0-2     • Epithelial Cells (non renal) 02/23/2023 Comment  /HPF Final    Comment: None Seen  REFERENCE RANGE: None Seen, 0-2     • Bacteria, UA 02/23/2023 Comment  None Seen /HPF Final    None Seen         Review of Systems   HENT: Negative.    Respiratory: Negative.    Cardiovascular: Negative.    Gastrointestinal: Positive for diarrhea (intermittent ). Negative for abdominal pain, blood in stool, constipation and rectal pain.        H/o colitis,    Genitourinary: Negative.    Musculoskeletal:        Left foot pain  Chronic coccyx pain    Psychiatric/Behavioral: Positive for sleep disturbance. The patient is nervous/anxious.        Objective   Vital Signs:  /60   Temp 97.2 °F (36.2 °C) (Infrared)   Resp 16   Ht 152.4 cm (60\")   Wt 42.8 kg (94 lb 6.4 oz)   BMI 18.44 kg/m²     Physical Exam  Vitals and nursing note reviewed.   Constitutional:       General: She is not in acute distress.     Appearance: Normal appearance. She is well-developed and well-groomed.   HENT:      Head: Normocephalic.      Right Ear: Tympanic membrane and ear canal normal.      Left Ear: Tympanic membrane and ear canal normal.      Nose: Nose normal.      Mouth/Throat:      Pharynx: Oropharynx is clear.   Eyes:      General: Lids are normal.      Conjunctiva/sclera: Conjunctivae normal.   Cardiovascular:      Rate and Rhythm: Normal rate and regular rhythm.      Heart sounds: Normal heart sounds.   Pulmonary:      Effort: Pulmonary effort is normal.      Breath sounds: Normal breath sounds.   Musculoskeletal:      Cervical back: Neck supple.      Left foot: No swelling or tenderness.   Neurological:      Mental Status: She is alert.                   Assessment and Plan   Diagnoses and all orders for this visit:    1. Medicare annual wellness visit, subsequent (Primary)    2. Primary insomnia    3. Anxiety    4. Moderate mixed hyperlipidemia not requiring statin therapy    5. Left foot " pain  -     Ambulatory Referral to Podiatry    6. Colitis    7. Vitamin D toxicity, accidental or unintentional, sequela      1. HLD- has improved, continue with healthy diet and exercise, and will check lipid panel yearly   2. Insomnia - she takes ambien as needed  UDS was done and contract updated, josue reviewed and is appropriate  3. Anxiety- continue buspirone- recommend deep breathing and meditation for panic attacks  4. Left foot pain- will refer to podiatry per patient request, recommend home exercises and supportive shoes  5. Colitis- recommend she follow up with GI. A naturopath is giving her naltrexone for this and inflammation, patient is aware that this is off label  6. Chronic coccyx pain with xray showing degenerative changes. She has done PT. She will follow up with ortho for further recommendations  7. Vitamin D is in the toxic range-  she will discontinue this . She is asymptomatic        Follow Up   Return in about 1 year (around 3/2/2024) for medicare wellness, with labs.  Patient was given instructions and counseling regarding her condition or for health maintenance advice. Please see specific information pulled into the AVS if appropriate.

## 2023-03-02 NOTE — PROGRESS NOTES
The ABCs of the Annual Wellness Visit  Subsequent Medicare Wellness Visit    Subjective    {Wrapup  Review (Popup)  Advance Care Planning  Labs  CC  Problem List  Visit Diagnosis  Medications  Result Review  Imaging  [facility]  [facility]  [facility]  [employer]  Encounters  Notes  Media  Procedures :23}  Terri albright is a 67 y.o. female who presents for a Subsequent Medicare Wellness Visit.    The following portions of the patient's history were reviewed and   updated as appropriate: allergies, current medications, past family history, past medical history, past social history, past surgical history and problem list.    Compared to one year ago, the patient feels her physical   health is {better worse same:53827}.    Compared to one year ago, the patient feels her mental   health is the same.    Recent Hospitalizations:  She was not admitted to the hospital during the last year.       Current Medical Providers:  Patient Care Team:  Kitty Schmitt APRN as PCP - General (Family Medicine)    Outpatient Medications Prior to Visit   Medication Sig Dispense Refill   • Ascorbic Acid (VITAMIN C PO) Take  by mouth.     • busPIRone (BUSPAR) 5 MG tablet Take 1 tablet by mouth 3 (Three) Times a Day. 90 tablet 3   • Glucos-Chond-Hyal Ac-Ca Fructo (MOVE FREE JOINT HEALTH ADVANCE PO) Take  by mouth.     • Multiple Vitamins-Minerals (ALGAE BASED CALCIUM PO) Take  by mouth.     • multivitamin (THERAGRAN) tablet tablet Take 1 tablet by mouth Daily.     • Omega-3 Fatty Acids (FISH OIL PO) Take  by mouth.     • Probiotic Product (PROBIOTIC PO) Take  by mouth.     • TURMERIC PO Take  by mouth.     • zolpidem (AMBIEN) 5 MG tablet Take 1 tablet by mouth At Night As Needed for Sleep. 30 tablet 3   • Budesonide (ENTOCORT EC) 3 MG 24 hr capsule Take 1 capsule by mouth Daily.       No facility-administered medications prior to visit.       No opioid medication identified on active medication  "list. I have reviewed chart for other potential  high risk medication/s and harmful drug interactions in the elderly.          Aspirin is not on active medication list.  {ASPIRIN NOT ON MEDICATION LIST INDICATED/NOT INDICATED:70738}.    Patient Active Problem List   Diagnosis   • Colitis   • Anxiety   • Diarrhea   • Insomnia   • Osteoporosis     Advance Care Planning  Advance Directive is not on file.  {ACP Discussion, Advance Directive not in EMR:56806}     Objective    Vitals:    03/02/23 0815   BP: 118/60   Resp: 16   Temp: 97.2 °F (36.2 °C)   TempSrc: Infrared   Weight: 42.8 kg (94 lb 6.4 oz)   Height: 152.4 cm (60\")   PainSc: 0-No pain     Estimated body mass index is 18.44 kg/m² as calculated from the following:    Height as of this encounter: 152.4 cm (60\").    Weight as of this encounter: 42.8 kg (94 lb 6.4 oz).    {BMI is below normal parameters (malnutrition). Recommendations:24431}      Does the patient have evidence of cognitive impairment?   {Yes/No:68712}    Lab Results   Component Value Date    CHLPL 205 (H) 02/23/2023    TRIG 94 02/23/2023    HDL 82 (H) 02/23/2023     (H) 02/23/2023    VLDL 16 02/23/2023          HEALTH RISK ASSESSMENT    Smoking Status:  Social History     Tobacco Use   Smoking Status Never   Smokeless Tobacco Not on file     Alcohol Consumption:  Social History     Substance and Sexual Activity   Alcohol Use Yes   • Alcohol/week: 4.0 standard drinks   • Types: 4 Glasses of wine per week    Comment: with dinner     Fall Risk Screen:    CHRISADI Fall Risk Assessment was completed, and patient is at LOW risk for falls.Assessment completed on:3/2/2023    Depression Screening:  PHQ-2/PHQ-9 Depression Screening 3/2/2023   Little Interest or Pleasure in Doing Things 0-->not at all   Feeling Down, Depressed or Hopeless 0-->not at all   PHQ-9: Brief Depression Severity Measure Score 0       Health Habits and Functional and Cognitive Screening:  Functional & Cognitive Status 3/2/2023 "   Do you have difficulty preparing food and eating? No   Do you have difficulty bathing yourself, getting dressed or grooming yourself? No   Do you have difficulty using the toilet? No   Do you have difficulty moving around from place to place? No   Do you have trouble with steps or getting out of a bed or a chair? No   Current Diet Well Balanced Diet   Dental Exam Up to date   Eye Exam Up to date   Exercise (times per week) 7 times per week   Current Exercises Include Walking   Do you need help using the phone?  No   Are you deaf or do you have serious difficulty hearing?  No   Do you need help with transportation? No   Do you need help shopping? No   Do you need help preparing meals?  No   Do you need help with housework?  No   Do you need help with laundry? No   Do you need help taking your medications? No   Do you need help managing money? No   Do you ever drive or ride in a car without wearing a seat belt? No   Have you felt unusual stress, anger or loneliness in the last month? No   Who do you live with? Spouse   If you need help, do you have trouble finding someone available to you? No   Have you been bothered in the last four weeks by sexual problems? No   Do you have difficulty concentrating, remembering or making decisions? No       Age-appropriate Screening Schedule:  Refer to the list below for future screening recommendations based on patient's age, sex and/or medical conditions. Orders for these recommended tests are listed in the plan section. The patient has been provided with a written plan.    Health Maintenance   Topic Date Due   • ANNUAL WELLNESS VISIT  01/12/2023   • MAMMOGRAM  04/07/2023   • LIPID PANEL  02/23/2024   • DXA SCAN  07/18/2024   • COLORECTAL CANCER SCREENING  09/01/2031   • TDAP/TD VACCINES (2 - Td or Tdap) 01/18/2032   • HEPATITIS C SCREENING  Completed   • COVID-19 Vaccine  Completed   • INFLUENZA VACCINE  Completed   • Pneumococcal Vaccine 65+  Completed   • ZOSTER VACCINE   Completed                CMS Preventative Services Quick Reference  Risk Factors Identified During Encounter:    {Medicare Wellness Risk Factors:19657}    The above risks/problems have been discussed with the patient.  Pertinent information has been shared with the patient in the After Visit Summary.    Diagnoses and all orders for this visit:    1. Primary insomnia (Primary)    2. Anxiety        Follow Up:   Next Medicare Wellness visit to be scheduled in 1 year.      An After Visit Summary and PPPS were made available to the patient.

## 2023-03-20 ENCOUNTER — OFFICE VISIT (OUTPATIENT)
Dept: ORTHOPEDIC SURGERY | Facility: CLINIC | Age: 68
End: 2023-03-20
Payer: MEDICARE

## 2023-03-20 VITALS — WEIGHT: 95.8 LBS | HEIGHT: 60 IN | BODY MASS INDEX: 18.81 KG/M2 | TEMPERATURE: 97.7 F

## 2023-03-20 DIAGNOSIS — M53.3 COCCYDYNIA: Primary | ICD-10-CM

## 2023-03-20 DIAGNOSIS — R52 PAIN: ICD-10-CM

## 2023-03-20 PROCEDURE — 1159F MED LIST DOCD IN RCRD: CPT | Performed by: NURSE PRACTITIONER

## 2023-03-20 PROCEDURE — 72220 X-RAY EXAM SACRUM TAILBONE: CPT | Performed by: NURSE PRACTITIONER

## 2023-03-20 PROCEDURE — 99213 OFFICE O/P EST LOW 20 MIN: CPT | Performed by: NURSE PRACTITIONER

## 2023-03-20 PROCEDURE — 1160F RVW MEDS BY RX/DR IN RCRD: CPT | Performed by: NURSE PRACTITIONER

## 2023-03-20 NOTE — PROGRESS NOTES
Patient Name: Terri albright   YOB: 1955  Referring Primary Care Physician: Kitty Schmitt APRN      Chief Complaint:    Chief Complaint   Patient presents with   • Lumbar Spine - Initial Evaluation, Pain     Tailbone         HPI:  Terri albright is a 67 y.o. female who presents to [facility] for evaluation of pain in her coccyx.  Symptoms started around September when she says she sat down abruptly on a concrete bench hitting her coccyx directly on a protruding portion of the bench.  She had immediate pain afterwards.  She has followed up with her PCP and had x-rays in October which did not show any acute findings.  Pain has persisted.  She has completed some physical therapy which tended to aggravate the symptoms.  She has been sitting on a donut style cushion.  She denies any pain down lower extremities.  She denies any bowel or bladder dysfunction.  Pain is worse at night especially trying to turn over in bed and with prolonged sitting and she tends to lean forward while seated.  Pain is also reproducible with deep palpation of the coccyx.  She is an avid walker and states walking up to 3 miles a day does not aggravate her symptoms.  This is an established patient of practice who has previously seen FRANK Bell for knee pain.  Prior pertinent records were reviewed.    PFSH:  See attached    ROS: As per HPI, otherwise negative    Objective:    Vitals:    03/20/23 0955   Temp: 97.7 °F (36.5 °C)     Body mass index is 18.71 kg/m².      Neurologic Exam     Mental Status   Oriented to person, place, and time.     Motor Exam   Muscle bulk: normal  Overall muscle tone: normal    Strength   Strength 5/5 throughout.     Sensory Exam   Right leg light touch: normal  Left leg light touch: normal    Gait, Coordination, and Reflexes     Gait  Gait: normal    Reflexes   Right patellar: 2+  Left patellar: 2+  Right achilles: 2+  Left achilles:  2+  Right Rome: absent  Left Rome: absent  Right ankle clonus: absent  Left pendular knee jerk: absent     Ortho Exam  Physical Exam  Neurological:      Mental Status: She is oriented to person, place, and time.      Motor: Motor strength is normal.      Gait: Gait is intact.      Deep Tendon Reflexes:      Reflex Scores:       Patellar reflexes are 2+ on the right side and 2+ on the left side.       Achilles reflexes are 2+ on the right side and 2+ on the left side.          IMAGING:     Indication: pain related symptoms,  Views: 2V AP&LAT sacrum  Findings: Tip of the coccyx does appear displaced posteriorly  Comparison views: None for direct review but report from October 2022 suggest no acute findings specifically no fractures    Assessment:           Diagnoses and all orders for this visit:    1. Coccydynia (Primary)  -     Ambulatory Referral to Pain Management  -     Ambulatory Referral to Physical Therapy Evaluate and treat    2. Pain  -     XR Sacrum & Coccyx             Plan:  For the coccydynia and suspected fracture, will refer her to pain management to try injections.  Would also like for her to return to physical therapy for manipulation, but after the injection and to give this more time to see if it would heal on its own.  She was advised to try Tylenol at night to help with the nighttime pain symptoms.  She cannot take NSAIDs due to colitis.  We will see her back in 3 months to see how she is progressing.  If symptoms persist we will get more advanced imaging.  Call sooner with worsening pain.      Return in about 3 months (around 6/20/2023).

## 2023-03-30 ENCOUNTER — OFFICE VISIT (OUTPATIENT)
Dept: PAIN MEDICINE | Facility: CLINIC | Age: 68
End: 2023-03-30
Payer: MEDICARE

## 2023-03-30 ENCOUNTER — PREP FOR SURGERY (OUTPATIENT)
Dept: SURGERY | Facility: SURGERY CENTER | Age: 68
End: 2023-03-30
Payer: MEDICARE

## 2023-03-30 VITALS
HEART RATE: 92 BPM | TEMPERATURE: 98.6 F | BODY MASS INDEX: 18.85 KG/M2 | OXYGEN SATURATION: 96 % | SYSTOLIC BLOOD PRESSURE: 128 MMHG | DIASTOLIC BLOOD PRESSURE: 76 MMHG | WEIGHT: 96 LBS | HEIGHT: 60 IN

## 2023-03-30 DIAGNOSIS — M53.3 COCCYX PAIN: Primary | ICD-10-CM

## 2023-03-30 DIAGNOSIS — M53.3 COCCYDYNIA: Primary | ICD-10-CM

## 2023-03-30 DIAGNOSIS — M53.3 COCCYDYNIA: ICD-10-CM

## 2023-03-30 PROCEDURE — 1160F RVW MEDS BY RX/DR IN RCRD: CPT

## 2023-03-30 PROCEDURE — 1125F AMNT PAIN NOTED PAIN PRSNT: CPT

## 2023-03-30 PROCEDURE — 99214 OFFICE O/P EST MOD 30 MIN: CPT

## 2023-03-30 PROCEDURE — 1159F MED LIST DOCD IN RCRD: CPT

## 2023-03-30 NOTE — PATIENT INSTRUCTIONS
Reviewed the procedure at length with the patient.  Included in the review was expectations, complications, risk and benefits.The procedure was described in detail and the risks, benefits and alternatives were discussed with the patient (including but not limited to: bleeding, infection, nerve damage, worsening of pain, inability to perform injection, paralysis, seizures, coma, no pain relief and death) who agreed to proceed.  Discussed the potential for sedation if warranted/wanted.  The procedure will plan to be performed at [facility] with fluoroscopic guidance(unless ultrasound is indicated) and could potentially have steroids and contrast dye used. Questions were answered and in a way the patient could understand.  Patient verbalized understanding and wishes to proceed.  This intervention will be ordered.  Discussed with patient that all procedures are part of a multimodal plan of care and include either formal PT or a home exercise program.  Patient has no evidence of coagulopathy or current infection.

## 2023-03-30 NOTE — PROGRESS NOTES
CHIEF COMPLAINT  Coccyx/tailbone pain    Subjective   Terri albright is a 67 y.o. female.   She presents to the office for evaluation of coccyx/tailbone pain. She was referred here by FRANK Al.     Patient reports that pain started in September 2022 after abruptly sitting down on a concrete bench and hitting her coccyx on a part of the bench that was sticking out. Coccyx pain has been present every since. Today pain is 4/10VAS in severity. Describes this pain as an intermittent sharp pain. Pain is worsened by prolonged sitting, turning over in bed, and any direct pressure to this area. Pain improves with rest/reposition, relieving pressure from this area, and OTC Tylenol.    She completed about 6 weeks ago. She reports that this was somewhat helpful. She continues with Kegel exercises and stretching/ROM exercises as tolerated.    Not a candidate for NSAIDs due to colitis     Past pain medications: No     Current pain medications: OTC Tylenol PRN     Past therapies:  Physical Therapy: Yes  Chiropractor: No  Dry Needling: Yes   Massage Therapy: No  TENS: No    Pain  This is a chronic (Pain is located in her tailbone, rates pain 4/10VAS in severity) problem. The current episode started more than 1 month ago. The problem occurs intermittently. The problem has been waxing and waning. Pertinent negatives include no abdominal pain, chest pain, chills, congestion, fatigue, fever, headaches, neck pain, numbness or weakness. Exacerbated by: prolonged sitting, turning in bed, any direct contact with tailbone. She has tried position changes, rest and acetaminophen (PT - somewhat helpful, Kinesiology tape, dry needling) for the symptoms. The treatment provided moderate relief.      PEG Assessment   What number best describes your pain on average in the past week?5  What number best describes how, during the past week, pain has interfered with your enjoyment of life?4  What number best describes how, during  the past week, pain has interfered with your general activity?  2    Current Outpatient Medications:   •  Ascorbic Acid (VITAMIN C PO), Take  by mouth., Disp: , Rfl:   •  busPIRone (BUSPAR) 5 MG tablet, Take 1 tablet by mouth 3 (Three) Times a Day., Disp: 90 tablet, Rfl: 3  •  Glucos-Chond-Hyal Ac-Ca Fructo (MOVE FREE JOINT HEALTH ADVANCE PO), Take  by mouth., Disp: , Rfl:   •  Multiple Vitamins-Minerals (ALGAE BASED CALCIUM PO), Take  by mouth., Disp: , Rfl:   •  multivitamin (THERAGRAN) tablet tablet, Take 1 tablet by mouth Daily., Disp: , Rfl:   •  NALTREXONE HCL PO, Take  by mouth., Disp: , Rfl:   •  Omega-3 Fatty Acids (FISH OIL PO), Take  by mouth., Disp: , Rfl:   •  Probiotic Product (PROBIOTIC PO), Take  by mouth., Disp: , Rfl:   •  TURMERIC PO, Take  by mouth., Disp: , Rfl:   •  zolpidem (AMBIEN) 5 MG tablet, Take 1 tablet by mouth At Night As Needed for Sleep., Disp: 30 tablet, Rfl: 3    The following portions of the patient's history were reviewed and updated as appropriate: allergies, current medications, past family history, past medical history, past social history, past surgical history and problem list.    REVIEW OF PERTINENT MEDICAL DATA  Reviewed office note from FRANK Al from 3/20/2023.  Patient presents for evaluation of coccyx pain.  Pain started around September after she abruptly sat down on a concrete bench hitting her coccyx on a protruding part of the bench.  Patient reported immediate pain.  She has seen her PCP and had x-rays in October for this pain.  No acute findings were noted.  She completed some physical therapy but noted that this caused increased pain.  She has been utilizing a donut cushion for pain relief.  Denies radicular pain.  Plan was to refer patient to pain management for injections.  Would also like patient to return to physical therapy for manipulation following injections.  She was encouraged to try Tylenol at night.  Not a candidate for NSAIDs due to  "colitis.  Malinda noted that if symptoms were to worsen can plan on getting updated and more advanced imaging. Malinda ordered xray of sacrum. This was mentioned in her note. Findings below.      Review of Systems   Constitutional: Negative for activity change, chills, fatigue and fever.   HENT: Negative for congestion.    Eyes: Negative for visual disturbance.   Respiratory: Negative for chest tightness and shortness of breath.    Cardiovascular: Negative for chest pain.   Gastrointestinal: Positive for diarrhea (r/t colitis). Negative for abdominal pain and constipation.   Genitourinary: Negative for difficulty urinating, dyspareunia and dysuria.   Musculoskeletal: Negative for back pain and neck pain.   Neurological: Negative for dizziness, weakness, light-headedness, numbness and headaches.   Psychiatric/Behavioral: Positive for sleep disturbance. Negative for agitation. The patient is nervous/anxious.      Vitals:    03/30/23 1319   BP: 128/76   Pulse: 92   Temp: 98.6 °F (37 °C)   SpO2: 96%   Weight: 43.5 kg (96 lb)   Height: 152.4 cm (60\")   PainSc:   4   PainLoc: Buttocks     Objective   Physical Exam  Constitutional:       Appearance: Normal appearance.   HENT:      Head: Normocephalic.   Cardiovascular:      Rate and Rhythm: Normal rate and regular rhythm.   Pulmonary:      Effort: Pulmonary effort is normal.      Breath sounds: Normal breath sounds.   Musculoskeletal:         General: Normal range of motion.      Cervical back: Normal range of motion.      Lumbar back: No tenderness. Normal range of motion. Negative right straight leg raise test and negative left straight leg raise test.        Back:       Comments: - lumbar facet loading/tenderness     Skin:     General: Skin is warm and dry.      Capillary Refill: Capillary refill takes less than 2 seconds.   Neurological:      General: No focal deficit present.      Mental Status: She is alert and oriented to person, place, and time.   Psychiatric:        "  Mood and Affect: Mood normal.         Behavior: Behavior normal.         Thought Content: Thought content normal.         Cognition and Memory: Cognition normal.       Assessment & Plan   Diagnoses and all orders for this visit:    1. Coccyx pain (Primary)    2. Coccydynia    --- Coccygeal Ligament Injection  Reviewed the procedure at length with the patient.  Included in the review was expectations, complications, risk and benefits.The procedure was described in detail and the risks, benefits and alternatives were discussed with the patient (including but not limited to: bleeding, infection, nerve damage, worsening of pain, inability to perform injection, paralysis, seizures, coma, no pain relief and death) who agreed to proceed.  Discussed the potential for sedation if warranted/wanted.  The procedure will plan to be performed at [facility] with fluoroscopic guidance(unless ultrasound is indicated) and could potentially have steroids and contrast dye used. Questions were answered and in a way the patient could understand.  Patient verbalized understanding and wishes to proceed.  This intervention will be ordered.  Discussed with patient that all procedures are part of a multimodal plan of care and include either formal PT or a home exercise program.  Patient has no evidence of coagulopathy or current infection.  --- Continue with HEP as tolerated  --- Follow-up for procdure     Pain / Disability Scale  The scale used for measurement of pain and/or disability for this patient was the [place] back pain disability scale.  The score was 8 on 03/30/2023    ALEX REPORT  As the clinician, I personally reviewed the ALEX from 3/30/23 while the patient was in the office today.    Dictated utilizing Dragon dictation.

## 2023-04-07 ENCOUNTER — TRANSCRIBE ORDERS (OUTPATIENT)
Dept: SURGERY | Facility: SURGERY CENTER | Age: 68
End: 2023-04-07
Payer: MEDICARE

## 2023-04-07 DIAGNOSIS — Z41.9 SURGERY, ELECTIVE: Primary | ICD-10-CM

## 2023-05-05 NOTE — DISCHARGE INSTRUCTIONS
[facility] - Post-procedure Instructions          --  While there are no absolute restrictions, it is recommended that you do not perform strenuous activity today. In the morning, you may resume your level of activity as before your block.    --  If you have a band-aid at your injection site, please remove it later today. Observe the area for any redness, swelling, pus-like drainage, or a temperature over 101°. If any of these symptoms occur, please call your doctor at [phone]. If after office hours, leave a message and the on-call provider will return your call.    --  Ice may be applied to your injection site. It is recommended you avoid direct heat (heating pad; hot tub) for 1-2 days.    --  Call [facility] at [phone] if you experience persistent headache, persistent bleeding from the injection site, or severe pain not relieved by heat or oral medication.    --  Do not make important decisions today.    --  Due to the effects of the block and/or the I.V. Sedation, DO NOT drive or operate hazardous machinery for 12 hours.  Local anesthetics may cause numbness after procedure and precautions must be taken with regards to operating equipment as well as with walking, even if ambulating with assistance of another person or with an assistive device.    --  Do not drink alcohol for 12 hours.    -- You may return to work tomorrow, or as directed by your referring doctor.    --  Occasionally you may notice a slight increase in your pain after the procedure. This should start to improve within the next 24-48 hours. Radiofrequency ablation procedure pain may last 3-4 weeks.    --  It may take as long as 3-4 days before you notice a gradual improvement in your pain and/or other symptoms.    -- You may continue to take your prescribed pain medication as needed.    --  Some normal possible side effects of steroid use could include fluid retention, increased blood sugar, dull headache,  increased sweating, increased appetite, mood swings and flushing.    --  Diabetics are recommended to watch their blood glucose level closely for 24-48 hours after the injection.    --  Must stay in PACU for 20 min upon arrival and prove no leg weakness before being discharged.    --  IN THE EVENT OF A LIFE THREATENING EMERGENCY, (CHEST PAIN, BREATHING DIFFICULTIES, PARALYSIS…) YOU SHOULD GO TO YOUR NEAREST EMERGENCY ROOM.    --  You should be contacted by our office within 2-3 days to schedule follow up or next appointment date.  If not contacted within 7 days, please call the office at [phone]

## 2023-05-05 NOTE — H&P
[facility]   HISTORY AND PHYSICAL    Patient Name: Terri albright  : 1955  MRN: [id number]  Primary Care Physician:  Kitty Schmitt APRN  Date of admission: 2023    Subjective   Subjective     Chief Complaint: Tailbone pain    History of Present Illness  Chronic, intermittent, sharp pain in the tailbone region since a fall onto concrete.      Review of Systems   Constitutional: Negative for chills and fever.   Respiratory: Negative for cough and shortness of breath.    Musculoskeletal: Positive for back pain.        Personal History     Past Medical History:   Diagnosis Date   • Allergic     seasonal   • Anxiety    • Arthritis    • Cataract    • Glaucoma    • Inflammatory bowel disease    • Knee swelling 10 years ago   • Osteopenia        Past Surgical History:   Procedure Laterality Date   •  SECTION     • COLONOSCOPY     • TONSILLECTOMY         Family History: family history includes Arthritis in her mother; Cancer in her brother; Developmental Disability in her son; Osteoporosis in her mother; Stroke in her father; Thyroid disease in her sister. Otherwise pertinent FHx was reviewed and not pertinent to current issue.    Social History:  reports that she has never smoked. She has never used smokeless tobacco. She reports current alcohol use of about 4.0 standard drinks per week. She reports that she does not use drugs.    Home Medications:  Ascorbic Acid, Glucos-Chond-Hyal Ac-Ca Fructo, Multiple Vitamins-Minerals, Naltrexone HCl, Omega-3 Fatty Acids, Probiotic Product, Turmeric, busPIRone, multivitamin, and zolpidem    Allergies:  No Known Allergies    Objective    Objective     Vitals:   Temp:  [97.4 °F (36.3 °C)] 97.4 °F (36.3 °C)  Heart Rate:  [75] 75  Resp:  [16] 16  BP: (111)/(64) 111/64    Physical Exam  Constitutional:       General: She is not in acute distress.  Pulmonary:      Effort: Pulmonary effort is normal. No respiratory distress.   Skin:     General: Skin is  warm and dry.   Neurological:      Mental Status: She is alert.   Psychiatric:         Mood and Affect: Mood normal.         Thought Content: Thought content normal.         Result Review    Result Review:  I have personally reviewed the results from the time of this admission to 5/8/2023 10:43 EDT and agree with these findings:  []  Laboratory list / accordion  []  Microbiology  []  Radiology  []  EKG/Telemetry   []  Cardiology/Vascular   []  Pathology  []  Old records  []  Other:  Most notable findings include: No new      Assessment & Plan   Assessment / Plan     Brief Patient Summary:  Terri albright is a 67 y.o. female who has chronic coccygeal pain    Active Hospital Problems:  Active Hospital Problems    Diagnosis    • **Coccydynia      Plan: Coccygeal ligament injection      DVT prophylaxis:  No DVT prophylaxis order currently exists.    Amalia Cohen MD

## 2023-05-08 ENCOUNTER — HOSPITAL ENCOUNTER (OUTPATIENT)
Dept: GENERAL RADIOLOGY | Facility: SURGERY CENTER | Age: 68
Setting detail: HOSPITAL OUTPATIENT SURGERY
End: 2023-05-08
Payer: MEDICARE

## 2023-05-08 ENCOUNTER — HOSPITAL ENCOUNTER (OUTPATIENT)
Facility: SURGERY CENTER | Age: 68
Setting detail: HOSPITAL OUTPATIENT SURGERY
Discharge: HOME | End: 2023-05-08
Attending: ANESTHESIOLOGY | Admitting: ANESTHESIOLOGY
Payer: MEDICARE

## 2023-05-08 VITALS
RESPIRATION RATE: 16 BRPM | BODY MASS INDEX: 18.85 KG/M2 | SYSTOLIC BLOOD PRESSURE: 149 MMHG | OXYGEN SATURATION: 99 % | TEMPERATURE: 97.4 F | HEART RATE: 76 BPM | DIASTOLIC BLOOD PRESSURE: 83 MMHG | WEIGHT: 96 LBS | HEIGHT: 60 IN

## 2023-05-08 DIAGNOSIS — Z41.9 SURGERY, ELECTIVE: ICD-10-CM

## 2023-05-08 PROCEDURE — 77002 NEEDLE LOCALIZATION BY XRAY: CPT | Performed by: ANESTHESIOLOGY

## 2023-05-08 PROCEDURE — 20550 NJX 1 TENDON SHEATH/LIGAMENT: CPT | Performed by: ANESTHESIOLOGY

## 2023-05-08 PROCEDURE — 77002 NEEDLE LOCALIZATION BY XRAY: CPT

## 2023-05-08 NOTE — OP NOTE
Sacrococcygeal Ligament Injection:  [facility]    PREOPERATIVE DIAGNOSIS: Tailbone pain    POSTOPERATIVE DIAGNOSIS: Same as preop diagnosis    PROCEDURE:    • Coccygeal Ligament Steroid Injection, Therapeutic, with fluoroscopic guidance 90784, [id number]    PRE-PROCEDURE DISCUSSION WITH PATIENT:    Risks and complications were discussed with the patient prior to starting the procedure and informed consent was obtained.  We discussed various topics including but not limited to bleeding & infection & injury & paralysis & coma & death & worsening of clinical picture & postprocedural soreness & lack of pain relief.    SURGEON:  Amalia Cohen MD    REASON FOR PROCEDURE: Diagnostic injection at this level is needed    SEDATION:   No sedation was used for this procedure  ANESTHETIC:  Marcaine 0.25%  STEROID:     15mg dexamethasone  TOTAL VOLUME OF INJECTATE: 3 mL    DESCRIPTON OF PROCEDURE:  After obtaining informed consent, the patient taken to the operating room and was placed in the prone position.  An IV  was not  started in the preoperative area.  All pressure points were well padded.  EKG, blood pressure, and pulse oximetry were monitored.  All sedation that was administered was given by the RN under my direct supervision and guidance.      The lumbosacral area was prepped with Chloraprep and draped in a sterile fashion with a sterile drape.  Lateral fluoroscopy was used to identify the sacrococcygeal ligament.  The skin and subcutaneous tissue overlying the area was anesthetized with 1% Lidocaine. A 22 gauge spinal needle was then advanced percutaneously through the anesthestized skin tract under fluoroscopic guidance into the sacrococcygeal ligament.  After negative aspiration for blood, a volume of 3mL consisting of 15mg of dexamethasone  mixed with 1.5mL of 0.25% bupivacaine and normal saline was injected without resistance.  The needle was removed intact.       ESTIMATED BLOOD LOSS:   minimal  SPECIMENS:  none    COMPLICATIONS:   No complications were noted.    TOLERANCE & DISCHARGE CONDITION:    The patient tolerated the procedure well.  The patient was transported to the recovery area without difficulties.  The patient was discharged to home under the care of family in stable and satisfactory condition.    PLAN OF CARE:  1. The patient was given our standard instruction sheet.  2. The patient will Return to clinic 3-4 wks  3. The patient will resume all medications as per the medication reconciliation sheet.

## 2023-06-05 RX ORDER — BUSPIRONE HYDROCHLORIDE 5 MG/1
TABLET ORAL
Qty: 90 TABLET | Refills: 3 | Status: SHIPPED | OUTPATIENT
Start: 2023-06-05

## 2023-06-16 ENCOUNTER — OFFICE VISIT (OUTPATIENT)
Dept: PAIN MEDICINE | Facility: CLINIC | Age: 68
End: 2023-06-16
Payer: MEDICARE

## 2023-06-16 VITALS
HEART RATE: 75 BPM | TEMPERATURE: 97.3 F | SYSTOLIC BLOOD PRESSURE: 128 MMHG | DIASTOLIC BLOOD PRESSURE: 80 MMHG | OXYGEN SATURATION: 97 % | RESPIRATION RATE: 12 BRPM | WEIGHT: 95.2 LBS | BODY MASS INDEX: 18.69 KG/M2 | HEIGHT: 60 IN

## 2023-06-16 DIAGNOSIS — M53.3 COCCYDYNIA: ICD-10-CM

## 2023-06-16 DIAGNOSIS — M53.3 COCCYX PAIN: Primary | ICD-10-CM

## 2023-06-16 NOTE — PROGRESS NOTES
CHIEF COMPLAINT  Coccyx/tailbone pain    Subjective   Terri albright is a 67 y.o. female  who presents to the office for follow-up of procedure.  She completed a coccygeal ligament injection on  5/8/23 performed by Dr. Cohen for management of coccyx pain. Patient reports no relief from the procedure.     Today pain is 6/10VAS in severity. Describes this pain as an intermittent sharp pain. Pain is worsened by prolonged sitting, turning over in bed, and any direct pressure to this area. Pain improves with rest/reposition, relieving pressure from this area, OTC Tylenol and Tumeric.      She completed about 6 weeks ago. She reports that this was somewhat helpful. She continues with Kegel exercises and stretching/ROM exercises as tolerated.     Not a candidate for NSAIDs due to colitis      Past pain medications: No     Current pain medications: OTC Tylenol PRN     Past therapies:  Physical Therapy: Yes  Chiropractor: No  Dry Needling: Yes   Massage Therapy: No  TENS: No    Pain  This is a chronic (Pain is located in her tailbone, rates pain 4/10VAS in severity) problem. The current episode started more than 1 month ago. The problem occurs intermittently. The problem has been unchanged (unchanged since last office visit). Pertinent negatives include no abdominal pain, chest pain, chills, congestion, fatigue, fever, headaches, neck pain, numbness or weakness. Exacerbated by: prolonged sitting, turning in bed, any direct contact with tailbone. She has tried position changes, rest and acetaminophen (PT - somewhat helpful, Kinesiology tape, dry needling) for the symptoms. The treatment provided moderate relief.      PEG Assessment   What number best describes your pain on average in the past week?6  What number best describes how, during the past week, pain has interfered with your enjoyment of life?0  What number best describes how, during the past week, pain has interfered with your general activity?  0    Review  "of Pertinent Medical Data ---  Imaging reviewed from FRANK Al note from 3/20/23.      The following portions of the patient's history were reviewed and updated as appropriate: allergies, current medications, past family history, past medical history, past social history, past surgical history, and problem list.    Review of Systems   Constitutional:  Negative for activity change, chills, fatigue and fever.   HENT:  Negative for congestion.    Respiratory:  Negative for chest tightness and shortness of breath.    Cardiovascular:  Negative for chest pain.   Gastrointestinal:  Positive for diarrhea. Negative for abdominal pain and constipation.   Genitourinary:  Negative for difficulty urinating and dysuria.   Musculoskeletal:  Positive for back pain (tailbone). Negative for neck pain.   Neurological:  Negative for dizziness, weakness, light-headedness, numbness and headaches.   Psychiatric/Behavioral:  Negative for agitation, sleep disturbance and suicidal ideas. The patient is not nervous/anxious.      I have reviewed and confirmed the accuracy of the ROS as documented by the MA/LPN/RN FRANK Lundberg     Vitals:    06/16/23 0804   BP: 128/80   BP Location: Right arm   Patient Position: Sitting   Cuff Size: Adult   Pulse: 75   Resp: 12   Temp: 97.3 °F (36.3 °C)   TempSrc: Temporal   SpO2: 97%   Weight: 43.2 kg (95 lb 3.2 oz)   Height: 152.4 cm (60\")   PainSc:   6     Objective   Physical Exam  Constitutional:       Appearance: Normal appearance.   HENT:      Head: Normocephalic.   Cardiovascular:      Rate and Rhythm: Normal rate and regular rhythm.   Pulmonary:      Effort: Pulmonary effort is normal.      Breath sounds: Normal breath sounds.   Musculoskeletal:         General: Normal range of motion.      Cervical back: Normal range of motion.      Lumbar back: No tenderness. Normal range of motion. Negative right straight leg raise test and negative left straight leg raise test.        Back:      "  Comments: - lumbar facet loading/tenderness     Skin:     General: Skin is warm and dry.      Capillary Refill: Capillary refill takes less than 2 seconds.   Neurological:      General: No focal deficit present.      Mental Status: She is alert and oriented to person, place, and time.   Psychiatric:         Mood and Affect: Mood normal.         Behavior: Behavior normal.         Thought Content: Thought content normal.         Cognition and Memory: Cognition normal.     Assessment & Plan   Diagnoses and all orders for this visit:    1. Coccyx pain (Primary)    2. Coccydynia    --- Trial Compound pain cream. Prescription sent to [facility]   --- Follow up with FRANK Al with Neurosurgery on 6/20/23  --- Follow-up as needed for increased pain       ALEX REPORT  As the clinician, I personally reviewed the ALEX from 6/16/23 while the patient was in the office today.    Dictated utilizing Dragon dictation.

## (undated) DEVICE — EPIDURAL TRAY: Brand: MEDLINE INDUSTRIES, INC.

## (undated) DEVICE — Device: Brand: PORTEX

## (undated) DEVICE — NDL SPINE 22G 31/2IN BLK

## (undated) DEVICE — GLV SURG TRIUMPH PF LTX 7 STRL